# Patient Record
Sex: FEMALE | Race: BLACK OR AFRICAN AMERICAN | NOT HISPANIC OR LATINO | Employment: UNEMPLOYED | URBAN - NONMETROPOLITAN AREA
[De-identification: names, ages, dates, MRNs, and addresses within clinical notes are randomized per-mention and may not be internally consistent; named-entity substitution may affect disease eponyms.]

---

## 2019-04-26 ENCOUNTER — HOSPITAL ENCOUNTER (EMERGENCY)
Facility: HOSPITAL | Age: 59
Discharge: HOME | End: 2019-04-26
Admitting: EMERGENCY MEDICINE

## 2019-04-26 ENCOUNTER — APPOINTMENT (OUTPATIENT)
Dept: GENERAL RADIOLOGY | Facility: HOSPITAL | Age: 59
End: 2019-04-26

## 2019-04-26 ENCOUNTER — APPOINTMENT (OUTPATIENT)
Dept: CT IMAGING | Facility: HOSPITAL | Age: 59
End: 2019-04-26

## 2019-04-26 VITALS
BODY MASS INDEX: 30.89 KG/M2 | WEIGHT: 163.6 LBS | SYSTOLIC BLOOD PRESSURE: 121 MMHG | HEART RATE: 78 BPM | HEIGHT: 61 IN | OXYGEN SATURATION: 100 % | TEMPERATURE: 98 F | RESPIRATION RATE: 15 BRPM | DIASTOLIC BLOOD PRESSURE: 63 MMHG

## 2019-04-26 DIAGNOSIS — R42 DIZZINESS: Primary | ICD-10-CM

## 2019-04-26 DIAGNOSIS — R31.21 ASYMPTOMATIC MICROSCOPIC HEMATURIA: ICD-10-CM

## 2019-04-26 LAB
ALBUMIN SERPL-MCNC: 4.4 G/DL (ref 3.5–5)
ALBUMIN/GLOB SERPL: 1.6 G/DL (ref 1.1–2.5)
ALP SERPL-CCNC: 95 U/L (ref 24–120)
ALT SERPL W P-5'-P-CCNC: 19 U/L (ref 0–54)
ANION GAP SERPL CALCULATED.3IONS-SCNC: 10 MMOL/L (ref 4–13)
AST SERPL-CCNC: 22 U/L (ref 7–45)
BACTERIA UR QL AUTO: ABNORMAL /HPF
BASOPHILS # BLD AUTO: 0.03 10*3/MM3 (ref 0–0.2)
BASOPHILS NFR BLD AUTO: 0.5 % (ref 0–2)
BILIRUB SERPL-MCNC: 0.3 MG/DL (ref 0.1–1)
BILIRUB UR QL STRIP: NEGATIVE
BUN BLD-MCNC: 16 MG/DL (ref 5–21)
BUN/CREAT SERPL: 17.4 (ref 7–25)
CALCIUM SPEC-SCNC: 9.2 MG/DL (ref 8.4–10.4)
CHLORIDE SERPL-SCNC: 104 MMOL/L (ref 98–110)
CLARITY UR: CLEAR
CO2 SERPL-SCNC: 26 MMOL/L (ref 24–31)
COLOR UR: YELLOW
CREAT BLD-MCNC: 0.92 MG/DL (ref 0.5–1.4)
DEPRECATED RDW RBC AUTO: 46.6 FL (ref 40–54)
EOSINOPHIL # BLD AUTO: 0.07 10*3/MM3 (ref 0–0.7)
EOSINOPHIL NFR BLD AUTO: 1.1 % (ref 0–4)
ERYTHROCYTE [DISTWIDTH] IN BLOOD BY AUTOMATED COUNT: 17.2 % (ref 12–15)
GFR SERPL CREATININE-BSD FRML MDRD: 76 ML/MIN/1.73
GLOBULIN UR ELPH-MCNC: 2.8 GM/DL
GLUCOSE BLD-MCNC: 260 MG/DL (ref 70–100)
GLUCOSE BLDC GLUCOMTR-MCNC: 200 MG/DL (ref 70–130)
GLUCOSE UR STRIP-MCNC: ABNORMAL MG/DL
HCT VFR BLD AUTO: 40.5 % (ref 37–47)
HGB BLD-MCNC: 13 G/DL (ref 12–16)
HGB UR QL STRIP.AUTO: ABNORMAL
HOLD SPECIMEN: NORMAL
HOLD SPECIMEN: NORMAL
HYALINE CASTS UR QL AUTO: ABNORMAL /LPF
IMM GRANULOCYTES # BLD AUTO: 0.03 10*3/MM3 (ref 0–0.05)
IMM GRANULOCYTES NFR BLD AUTO: 0.5 % (ref 0–5)
KETONES UR QL STRIP: NEGATIVE
LEUKOCYTE ESTERASE UR QL STRIP.AUTO: NEGATIVE
LYMPHOCYTES # BLD AUTO: 1.23 10*3/MM3 (ref 0.72–4.86)
LYMPHOCYTES NFR BLD AUTO: 19.7 % (ref 15–45)
MCH RBC QN AUTO: 24.6 PG (ref 28–32)
MCHC RBC AUTO-ENTMCNC: 32.1 G/DL (ref 33–36)
MCV RBC AUTO: 76.7 FL (ref 82–98)
MONOCYTES # BLD AUTO: 0.38 10*3/MM3 (ref 0.19–1.3)
MONOCYTES NFR BLD AUTO: 6.1 % (ref 4–12)
NEUTROPHILS # BLD AUTO: 4.49 10*3/MM3 (ref 1.87–8.4)
NEUTROPHILS NFR BLD AUTO: 72.1 % (ref 39–78)
NITRITE UR QL STRIP: NEGATIVE
NRBC BLD AUTO-RTO: 0 /100 WBC (ref 0–0.2)
PH UR STRIP.AUTO: <=5 [PH] (ref 5–8)
PLATELET # BLD AUTO: 286 10*3/MM3 (ref 130–400)
PMV BLD AUTO: 9.8 FL (ref 6–12)
POTASSIUM BLD-SCNC: 4.1 MMOL/L (ref 3.5–5.3)
PROT SERPL-MCNC: 7.2 G/DL (ref 6.3–8.7)
PROT UR QL STRIP: NEGATIVE
RBC # BLD AUTO: 5.28 10*6/MM3 (ref 4.2–5.4)
RBC # UR: ABNORMAL /HPF
REF LAB TEST METHOD: ABNORMAL
SODIUM BLD-SCNC: 140 MMOL/L (ref 135–145)
SP GR UR STRIP: 1.03 (ref 1–1.03)
SQUAMOUS #/AREA URNS HPF: ABNORMAL /HPF
TROPONIN I SERPL-MCNC: <0.012 NG/ML (ref 0–0.03)
UROBILINOGEN UR QL STRIP: ABNORMAL
WBC NRBC COR # BLD: 6.23 10*3/MM3 (ref 4.8–10.8)
WBC UR QL AUTO: ABNORMAL /HPF
WHOLE BLOOD HOLD SPECIMEN: NORMAL
WHOLE BLOOD HOLD SPECIMEN: NORMAL

## 2019-04-26 PROCEDURE — 93010 ELECTROCARDIOGRAM REPORT: CPT | Performed by: INTERNAL MEDICINE

## 2019-04-26 PROCEDURE — 87086 URINE CULTURE/COLONY COUNT: CPT | Performed by: PHYSICIAN ASSISTANT

## 2019-04-26 PROCEDURE — 93005 ELECTROCARDIOGRAM TRACING: CPT | Performed by: PHYSICIAN ASSISTANT

## 2019-04-26 PROCEDURE — 84484 ASSAY OF TROPONIN QUANT: CPT | Performed by: PHYSICIAN ASSISTANT

## 2019-04-26 PROCEDURE — 70450 CT HEAD/BRAIN W/O DYE: CPT

## 2019-04-26 PROCEDURE — 80053 COMPREHEN METABOLIC PANEL: CPT | Performed by: PHYSICIAN ASSISTANT

## 2019-04-26 PROCEDURE — 85025 COMPLETE CBC W/AUTO DIFF WBC: CPT | Performed by: PHYSICIAN ASSISTANT

## 2019-04-26 PROCEDURE — 82962 GLUCOSE BLOOD TEST: CPT

## 2019-04-26 PROCEDURE — 81001 URINALYSIS AUTO W/SCOPE: CPT | Performed by: PHYSICIAN ASSISTANT

## 2019-04-26 PROCEDURE — 99284 EMERGENCY DEPT VISIT MOD MDM: CPT

## 2019-04-26 PROCEDURE — 93005 ELECTROCARDIOGRAM TRACING: CPT

## 2019-04-26 PROCEDURE — 71045 X-RAY EXAM CHEST 1 VIEW: CPT

## 2019-04-26 RX ORDER — MECLIZINE HYDROCHLORIDE 25 MG/1
25 TABLET ORAL 3 TIMES DAILY PRN
Qty: 20 TABLET | Refills: 0 | Status: SHIPPED | OUTPATIENT
Start: 2019-04-26 | End: 2019-05-01

## 2019-04-26 RX ORDER — ATORVASTATIN CALCIUM 40 MG/1
40 TABLET, FILM COATED ORAL NIGHTLY
COMMUNITY

## 2019-04-26 RX ORDER — ASPIRIN 325 MG
325 TABLET ORAL DAILY
COMMUNITY

## 2019-04-26 RX ORDER — AMLODIPINE BESYLATE 5 MG/1
5 TABLET ORAL DAILY
COMMUNITY

## 2019-04-26 RX ORDER — MECLIZINE HYDROCHLORIDE 25 MG/1
25 TABLET ORAL ONCE
Status: COMPLETED | OUTPATIENT
Start: 2019-04-26 | End: 2019-04-26

## 2019-04-26 RX ORDER — LISINOPRIL 20 MG/1
20 TABLET ORAL DAILY
COMMUNITY

## 2019-04-26 RX ADMIN — MECLIZINE HYDROCHLORIDE 25 MG: 25 TABLET ORAL at 15:51

## 2019-04-26 RX ADMIN — SODIUM CHLORIDE 1000 ML: 9 INJECTION, SOLUTION INTRAVENOUS at 15:48

## 2019-04-28 LAB — BACTERIA SPEC AEROBE CULT: NORMAL

## 2019-04-29 ENCOUNTER — APPOINTMENT (OUTPATIENT)
Dept: CT IMAGING | Age: 59
End: 2019-04-29
Payer: COMMERCIAL

## 2019-04-29 ENCOUNTER — HOSPITAL ENCOUNTER (EMERGENCY)
Age: 59
Discharge: HOME | End: 2019-04-30
Attending: FAMILY MEDICINE
Payer: COMMERCIAL

## 2019-04-29 DIAGNOSIS — N20.0 NEPHROLITHIASIS: Primary | ICD-10-CM

## 2019-04-29 DIAGNOSIS — A49.9 BACTERIAL UTI: ICD-10-CM

## 2019-04-29 DIAGNOSIS — N39.0 BACTERIAL UTI: ICD-10-CM

## 2019-04-29 DIAGNOSIS — M54.50 MIDLINE LOW BACK PAIN WITHOUT SCIATICA, UNSPECIFIED CHRONICITY: ICD-10-CM

## 2019-04-29 LAB
ALBUMIN SERPL-MCNC: 4.7 G/DL (ref 3.5–5.2)
ALP BLD-CCNC: 110 U/L (ref 35–104)
ALT SERPL-CCNC: 14 U/L (ref 5–33)
ANION GAP SERPL CALCULATED.3IONS-SCNC: 11 MMOL/L (ref 7–19)
AST SERPL-CCNC: 19 U/L (ref 5–32)
BASOPHILS ABSOLUTE: 0 K/UL (ref 0–0.2)
BASOPHILS RELATIVE PERCENT: 0.5 % (ref 0–1)
BILIRUB SERPL-MCNC: <0.2 MG/DL (ref 0.2–1.2)
BILIRUBIN URINE: NEGATIVE
BLOOD, URINE: ABNORMAL
BUN BLDV-MCNC: 16 MG/DL (ref 6–20)
CALCIUM SERPL-MCNC: 9.8 MG/DL (ref 8.6–10)
CHLORIDE BLD-SCNC: 103 MMOL/L (ref 98–111)
CLARITY: CLEAR
CO2: 25 MMOL/L (ref 22–29)
COLOR: YELLOW
CREAT SERPL-MCNC: 1 MG/DL (ref 0.5–0.9)
EOSINOPHILS ABSOLUTE: 0.1 K/UL (ref 0–0.6)
EOSINOPHILS RELATIVE PERCENT: 1 % (ref 0–5)
GFR NON-AFRICAN AMERICAN: 57
GLUCOSE BLD-MCNC: 138 MG/DL (ref 74–109)
GLUCOSE URINE: NEGATIVE MG/DL
HCT VFR BLD CALC: 45.2 % (ref 37–47)
HEMOGLOBIN: 14.1 G/DL (ref 12–16)
KETONES, URINE: NEGATIVE MG/DL
LEUKOCYTE ESTERASE, URINE: ABNORMAL
LYMPHOCYTES ABSOLUTE: 2 K/UL (ref 1.1–4.5)
LYMPHOCYTES RELATIVE PERCENT: 25 % (ref 20–40)
MCH RBC QN AUTO: 24.7 PG (ref 27–31)
MCHC RBC AUTO-ENTMCNC: 31.2 G/DL (ref 33–37)
MCV RBC AUTO: 79.3 FL (ref 81–99)
MONOCYTES ABSOLUTE: 0.5 K/UL (ref 0–0.9)
MONOCYTES RELATIVE PERCENT: 6.7 % (ref 0–10)
NEUTROPHILS ABSOLUTE: 5.3 K/UL (ref 1.5–7.5)
NEUTROPHILS RELATIVE PERCENT: 66.3 % (ref 50–65)
NITRITE, URINE: NEGATIVE
PDW BLD-RTO: 17.5 % (ref 11.5–14.5)
PH UA: 5 (ref 5–8)
PLATELET # BLD: 301 K/UL (ref 130–400)
PMV BLD AUTO: 10.2 FL (ref 9.4–12.3)
POTASSIUM SERPL-SCNC: 4.3 MMOL/L (ref 3.5–5)
PROTEIN UA: NEGATIVE MG/DL
RBC # BLD: 5.7 M/UL (ref 4.2–5.4)
SODIUM BLD-SCNC: 139 MMOL/L (ref 136–145)
SPECIFIC GRAVITY UA: 1.01 (ref 1–1.03)
TOTAL PROTEIN: 7.9 G/DL (ref 6.6–8.7)
URINE REFLEX TO CULTURE: YES
UROBILINOGEN, URINE: 0.2 E.U./DL
WBC # BLD: 7.9 K/UL (ref 4.8–10.8)

## 2019-04-29 PROCEDURE — 36415 COLL VENOUS BLD VENIPUNCTURE: CPT

## 2019-04-29 PROCEDURE — 2580000003 HC RX 258: Performed by: FAMILY MEDICINE

## 2019-04-29 PROCEDURE — 85025 COMPLETE CBC W/AUTO DIFF WBC: CPT

## 2019-04-29 PROCEDURE — 74176 CT ABD & PELVIS W/O CONTRAST: CPT

## 2019-04-29 PROCEDURE — 99284 EMERGENCY DEPT VISIT MOD MDM: CPT | Performed by: FAMILY MEDICINE

## 2019-04-29 PROCEDURE — 6370000000 HC RX 637 (ALT 250 FOR IP): Performed by: FAMILY MEDICINE

## 2019-04-29 PROCEDURE — 80053 COMPREHEN METABOLIC PANEL: CPT

## 2019-04-29 PROCEDURE — 99284 EMERGENCY DEPT VISIT MOD MDM: CPT

## 2019-04-29 RX ORDER — ONDANSETRON 4 MG/1
4 TABLET, ORALLY DISINTEGRATING ORAL ONCE
Status: COMPLETED | OUTPATIENT
Start: 2019-04-29 | End: 2019-04-29

## 2019-04-29 RX ORDER — MECLIZINE HYDROCHLORIDE 25 MG/1
25 TABLET ORAL 3 TIMES DAILY PRN
COMMUNITY
End: 2022-03-03

## 2019-04-29 RX ORDER — AMLODIPINE BESYLATE 5 MG/1
5 TABLET ORAL DAILY
COMMUNITY

## 2019-04-29 RX ORDER — SODIUM CHLORIDE 9 MG/ML
INJECTION, SOLUTION INTRAVENOUS CONTINUOUS
Status: DISCONTINUED | OUTPATIENT
Start: 2019-04-29 | End: 2019-04-30 | Stop reason: HOSPADM

## 2019-04-29 RX ORDER — HYDROCODONE BITARTRATE AND ACETAMINOPHEN 7.5; 325 MG/1; MG/1
1 TABLET ORAL ONCE
Status: DISCONTINUED | OUTPATIENT
Start: 2019-04-29 | End: 2019-04-30 | Stop reason: HOSPADM

## 2019-04-29 RX ORDER — LISINOPRIL 20 MG/1
20 TABLET ORAL DAILY
COMMUNITY

## 2019-04-29 RX ORDER — LEVOCETIRIZINE DIHYDROCHLORIDE 5 MG/1
5 TABLET, FILM COATED ORAL NIGHTLY
COMMUNITY
End: 2022-03-03

## 2019-04-29 RX ORDER — ASPIRIN 325 MG
325 TABLET ORAL DAILY
COMMUNITY

## 2019-04-29 RX ORDER — ATORVASTATIN CALCIUM 40 MG/1
40 TABLET, FILM COATED ORAL DAILY
COMMUNITY

## 2019-04-29 RX ADMIN — SODIUM CHLORIDE: 9 INJECTION, SOLUTION INTRAVENOUS at 22:45

## 2019-04-29 RX ADMIN — ONDANSETRON 4 MG: 4 TABLET, ORALLY DISINTEGRATING ORAL at 22:44

## 2019-04-29 SDOH — HEALTH STABILITY: MENTAL HEALTH: HOW OFTEN DO YOU HAVE A DRINK CONTAINING ALCOHOL?: NEVER

## 2019-04-29 ASSESSMENT — ENCOUNTER SYMPTOMS
WHEEZING: 0
DIARRHEA: 0
COUGH: 0
BACK PAIN: 1
CHEST TIGHTNESS: 0
NAUSEA: 0
COLOR CHANGE: 0
VOMITING: 0
RHINORRHEA: 0
SINUS PAIN: 0
SHORTNESS OF BREATH: 0
ABDOMINAL PAIN: 0
CONSTIPATION: 0

## 2019-04-29 ASSESSMENT — PAIN SCALES - GENERAL
PAINLEVEL_OUTOF10: 6
PAINLEVEL_OUTOF10: 4

## 2019-04-30 VITALS
BODY MASS INDEX: 30.78 KG/M2 | WEIGHT: 163 LBS | TEMPERATURE: 98.6 F | RESPIRATION RATE: 16 BRPM | HEIGHT: 61 IN | OXYGEN SATURATION: 91 % | DIASTOLIC BLOOD PRESSURE: 73 MMHG | SYSTOLIC BLOOD PRESSURE: 118 MMHG | HEART RATE: 90 BPM

## 2019-04-30 LAB
BACTERIA: ABNORMAL /HPF
EPITHELIAL CELLS, UA: ABNORMAL /HPF
MUCUS: ABNORMAL /LPF
RBC UA: ABNORMAL /HPF (ref 0–2)
WBC UA: ABNORMAL /HPF (ref 0–5)

## 2019-04-30 PROCEDURE — 81001 URINALYSIS AUTO W/SCOPE: CPT

## 2019-04-30 PROCEDURE — 87086 URINE CULTURE/COLONY COUNT: CPT

## 2019-04-30 RX ORDER — SULFAMETHOXAZOLE AND TRIMETHOPRIM 800; 160 MG/1; MG/1
1 TABLET ORAL 2 TIMES DAILY
Qty: 14 TABLET | Refills: 0 | Status: SHIPPED | OUTPATIENT
Start: 2019-04-30 | End: 2019-05-07

## 2019-04-30 RX ORDER — ONDANSETRON 4 MG/1
4 TABLET, ORALLY DISINTEGRATING ORAL EVERY 8 HOURS PRN
Qty: 15 TABLET | Refills: 0 | Status: SHIPPED | OUTPATIENT
Start: 2019-04-30 | End: 2022-03-03

## 2019-04-30 RX ORDER — PHENAZOPYRIDINE HYDROCHLORIDE 100 MG/1
100 TABLET, FILM COATED ORAL 3 TIMES DAILY PRN
Qty: 6 TABLET | Refills: 0 | Status: SHIPPED | OUTPATIENT
Start: 2019-04-30 | End: 2019-05-03

## 2019-04-30 NOTE — ED PROVIDER NOTES
[facility]  eMERGENCY dEPARTMENT eNCOUnter      Pt Name: Koby Aguilera  MRN: [id number]  [place] 1960  Date of evaluation: 4/29/2019  Provider: Gurjit Mejia MD    [address] [place] [address]       Chief Complaint   Patient presents with    Flank Pain     left         HISTORY OF PRESENT ILLNESS   (Location/Symptom, Timing/Onset,Context/Setting, Quality, Duration, Modifying Factors, Severity)  Note limiting factors. Koby Aguilera is a 60 y.o. female who presents to the emergency department      Patient presents today with complaint of back pain. It's that this pain has been off and on for approximately one week now she had seen her primary provider who detected blood in her urine and then suggested a CAT scan to make sure that there is no kidney stone. Patient states that movement seems to make the pain worse and that her primary care doctor provided her no medications either for the pain nor her nausea. NursingNotes were reviewed. REVIEW OF SYSTEMS    (2-9 systems for level 4, 10 or more for level 5)     Review of Systems   Constitutional: Negative for activity change, appetite change, chills, fatigue and fever. HENT: Negative for congestion, rhinorrhea and sinus pain. Respiratory: Negative for cough, chest tightness, shortness of breath and wheezing. Cardiovascular: Negative for chest pain, palpitations and leg swelling. Gastrointestinal: Negative for abdominal pain, constipation, diarrhea, nausea and vomiting. Genitourinary: Negative for difficulty urinating and dysuria. Musculoskeletal: Positive for back pain. Skin: Negative for color change, pallor and rash. Neurological: Negative for syncope, weakness and numbness. Hematological: Does not bruise/bleed easily.             PAST MEDICALHISTORY     Past Medical History:   Diagnosis Date    Diabetes mellitus ([address]     Hyperlipidemia     Hypertension          SURGICAL HISTORY       Past Surgical History:   Procedure Emotionally abused: None     Physically abused: None     Forced sexual activity: None   Other Topics Concern    None   Social History Narrative    None       SCREENINGS             PHYSICAL EXAM    (up to 7 for level 4, 8 or more for level 5)     ED Triage Vitals [04/29/19 1850]   BP Temp Temp Source Pulse Resp SpO2 Height Weight   (!) 156/92 98.6 °F (37 °C) Oral 97 18 96 % 5' 1\" (1.549 m) 163 lb (73.9 kg)       Physical Exam   Constitutional: She is oriented to person, place, and time. She appears well-developed and well-nourished. No distress. HENT:   Head: Normocephalic. Cardiovascular: Normal rate, normal heart sounds and intact distal pulses. Pulmonary/Chest: Effort normal and breath sounds normal.   Abdominal: Soft. Bowel sounds are normal. She exhibits no distension and no mass. There is no tenderness. There is no rebound and no guarding. Musculoskeletal: She exhibits no edema. Lumbar back: She exhibits decreased range of motion and tenderness. Back:    Neurological: She is alert and oriented to person, place, and time. Skin: She is not diaphoretic.        DIAGNOSTIC RESULTS     EKG: All EKG's areinterpreted by the Emergency Department Physician who either signs or Co-signs this chart in the absence of a cardiologist.        RADIOLOGY:  Non-plain film images such as CT, Ultrasound and MRI are read by the radiologist. Plain radiographic images are visualized and preliminarily interpreted bythe emergency physician with the below findings:          CT ABDOMEN PELVIS WO CONTRAST Additional Contrast? None    (Results Pending)           LABS:  Labs Reviewed   CBC WITH AUTO DIFFERENTIAL - Abnormal; Notable for the following components:       Result Value    RBC 5.70 (*)     MCV 79.3 (*)     MCH 24.7 (*)     MCHC 31.2 (*)     RDW 17.5 (*)     Neutrophils % 66.3 (*)     All other components within normal limits   COMPREHENSIVE METABOLIC PANEL - Abnormal; Notable for the following components: Glucose 138 (*)     CREATININE 1.0 (*)     GFR Non-African American 57 (*)     Alkaline Phosphatase 110 (*)     All other components within normal limits   URINE RT REFLEX TO CULTURE - Abnormal; Notable for the following components:    Blood, Urine MODERATE (*)     Leukocyte Esterase, Urine MODERATE (*)     All other components within normal limits   URINE CULTURE   MICROSCOPIC URINALYSIS       All other labs were within normal range or not returned as of this dictation. EMERGENCY DEPARTMENT COURSE and DIFFERENTIAL DIAGNOSIS/MDM:   Vitals:    Vitals:    04/29/19 1850   BP: (!) 156/92   Pulse: 97   Resp: 18   Temp: 98.6 °F (37 °C)   TempSrc: Oral   SpO2: 96%   Weight: 163 lb (73.9 kg)   Height: 5' 1\" (1.549 m)       MDM      Reassessment      CONSULTS:  None    PROCEDURES:  Unless otherwise noted below, none     Procedures    FINAL IMPRESSION      1. Nephrolithiasis    2. Midline low back pain without sciatica, unspecified chronicity    3. Bacterial UTI          DISPOSITION/PLAN   DISPOSITION Decision To Discharge 04/29/2019 11:58:24 PM      PATIENT REFERRED TO:  No follow-up provider specified.     DISCHARGE MEDICATIONS:  New Prescriptions    ONDANSETRON (ZOFRAN ODT) 4 MG DISINTEGRATING TABLET    Take 1 tablet by mouth every 8 hours as needed for Nausea or Vomiting    PHENAZOPYRIDINE (PYRIDIUM) 100 MG TABLET    Take 1 tablet by mouth 3 times daily as needed for Pain    SULFAMETHOXAZOLE-TRIMETHOPRIM (BACTRIM DS) 800-160 MG PER TABLET    Take 1 tablet by mouth 2 times daily for 7 days          (Please note that portions of this note were completed with a voice recognition program.  Efforts were made to edit thedictations but occasionally words are mis-transcribed.)    Kary Garcia MD (electronically signed)  Attending Emergency Physician         Travis Arambula MD  04/30/19 0005       Travis Arambula MD  05/02/19 [id number]

## 2019-05-02 LAB — URINE CULTURE, ROUTINE: NORMAL

## 2021-05-12 ENCOUNTER — HOSPITAL ENCOUNTER (EMERGENCY)
Facility: HOSPITAL | Age: 61
Discharge: HOME | End: 2021-05-12
Attending: EMERGENCY MEDICINE | Admitting: EMERGENCY MEDICINE

## 2021-05-12 VITALS
WEIGHT: 172 LBS | DIASTOLIC BLOOD PRESSURE: 68 MMHG | OXYGEN SATURATION: 99 % | BODY MASS INDEX: 32.47 KG/M2 | RESPIRATION RATE: 18 BRPM | HEIGHT: 61 IN | TEMPERATURE: 98.7 F | SYSTOLIC BLOOD PRESSURE: 123 MMHG | HEART RATE: 74 BPM

## 2021-05-12 DIAGNOSIS — N30.90 CYSTITIS: Primary | ICD-10-CM

## 2021-05-12 DIAGNOSIS — N32.89 BLADDER SPASMS: ICD-10-CM

## 2021-05-12 LAB
ALBUMIN SERPL-MCNC: 4.3 G/DL (ref 3.5–5.2)
ALBUMIN/GLOB SERPL: 1.7 G/DL
ALP SERPL-CCNC: 111 U/L (ref 39–117)
ALT SERPL W P-5'-P-CCNC: 20 U/L (ref 1–33)
ANION GAP SERPL CALCULATED.3IONS-SCNC: 10 MMOL/L (ref 5–15)
AST SERPL-CCNC: 19 U/L (ref 1–32)
BACTERIA UR QL AUTO: ABNORMAL /HPF
BASOPHILS # BLD AUTO: 0.03 10*3/MM3 (ref 0–0.2)
BASOPHILS NFR BLD AUTO: 0.7 % (ref 0–1.5)
BILIRUB SERPL-MCNC: <0.2 MG/DL (ref 0–1.2)
BILIRUB UR QL STRIP: NEGATIVE
BUN SERPL-MCNC: 16 MG/DL (ref 8–23)
BUN/CREAT SERPL: 15 (ref 7–25)
CALCIUM SPEC-SCNC: 9.3 MG/DL (ref 8.6–10.5)
CHLORIDE SERPL-SCNC: 104 MMOL/L (ref 98–107)
CLARITY UR: CLEAR
CO2 SERPL-SCNC: 28 MMOL/L (ref 22–29)
COLOR UR: YELLOW
CREAT SERPL-MCNC: 1.07 MG/DL (ref 0.57–1)
DEPRECATED RDW RBC AUTO: 45.7 FL (ref 37–54)
EOSINOPHIL # BLD AUTO: 0.07 10*3/MM3 (ref 0–0.4)
EOSINOPHIL NFR BLD AUTO: 1.5 % (ref 0.3–6.2)
ERYTHROCYTE [DISTWIDTH] IN BLOOD BY AUTOMATED COUNT: 16.4 % (ref 12.3–15.4)
GFR SERPL CREATININE-BSD FRML MDRD: 63 ML/MIN/1.73
GLOBULIN UR ELPH-MCNC: 2.6 GM/DL
GLUCOSE SERPL-MCNC: 316 MG/DL (ref 65–99)
GLUCOSE UR STRIP-MCNC: NEGATIVE MG/DL
HCT VFR BLD AUTO: 36.3 % (ref 34–46.6)
HGB BLD-MCNC: 11.2 G/DL (ref 12–15.9)
HGB UR QL STRIP.AUTO: NEGATIVE
HYALINE CASTS UR QL AUTO: ABNORMAL /LPF
IMM GRANULOCYTES # BLD AUTO: 0.02 10*3/MM3 (ref 0–0.05)
IMM GRANULOCYTES NFR BLD AUTO: 0.4 % (ref 0–0.5)
KETONES UR QL STRIP: ABNORMAL
LEUKOCYTE ESTERASE UR QL STRIP.AUTO: ABNORMAL
LYMPHOCYTES # BLD AUTO: 1.37 10*3/MM3 (ref 0.7–3.1)
LYMPHOCYTES NFR BLD AUTO: 29.8 % (ref 19.6–45.3)
MCH RBC QN AUTO: 23.8 PG (ref 26.6–33)
MCHC RBC AUTO-ENTMCNC: 30.9 G/DL (ref 31.5–35.7)
MCV RBC AUTO: 77.1 FL (ref 79–97)
MONOCYTES # BLD AUTO: 0.4 10*3/MM3 (ref 0.1–0.9)
MONOCYTES NFR BLD AUTO: 8.7 % (ref 5–12)
NEUTROPHILS NFR BLD AUTO: 2.7 10*3/MM3 (ref 1.7–7)
NEUTROPHILS NFR BLD AUTO: 58.9 % (ref 42.7–76)
NITRITE UR QL STRIP: NEGATIVE
NRBC BLD AUTO-RTO: 0 /100 WBC (ref 0–0.2)
PH UR STRIP.AUTO: 7 [PH] (ref 5–8)
PLATELET # BLD AUTO: 248 10*3/MM3 (ref 140–450)
PMV BLD AUTO: 10.2 FL (ref 6–12)
POTASSIUM SERPL-SCNC: 4.2 MMOL/L (ref 3.5–5.2)
PROT SERPL-MCNC: 6.9 G/DL (ref 6–8.5)
PROT UR QL STRIP: NEGATIVE
RBC # BLD AUTO: 4.71 10*6/MM3 (ref 3.77–5.28)
RBC # UR: ABNORMAL /HPF
REF LAB TEST METHOD: ABNORMAL
SODIUM SERPL-SCNC: 142 MMOL/L (ref 136–145)
SP GR UR STRIP: 1.02 (ref 1–1.03)
SQUAMOUS #/AREA URNS HPF: ABNORMAL /HPF
UROBILINOGEN UR QL STRIP: ABNORMAL
WBC # BLD AUTO: 4.59 10*3/MM3 (ref 3.4–10.8)
WBC UR QL AUTO: ABNORMAL /HPF

## 2021-05-12 PROCEDURE — 81001 URINALYSIS AUTO W/SCOPE: CPT | Performed by: EMERGENCY MEDICINE

## 2021-05-12 PROCEDURE — 25010000002 CEFTRIAXONE PER 250 MG: Performed by: EMERGENCY MEDICINE

## 2021-05-12 PROCEDURE — 80053 COMPREHEN METABOLIC PANEL: CPT | Performed by: EMERGENCY MEDICINE

## 2021-05-12 PROCEDURE — 96365 THER/PROPH/DIAG IV INF INIT: CPT

## 2021-05-12 PROCEDURE — 51798 US URINE CAPACITY MEASURE: CPT

## 2021-05-12 PROCEDURE — 85025 COMPLETE CBC W/AUTO DIFF WBC: CPT | Performed by: EMERGENCY MEDICINE

## 2021-05-12 PROCEDURE — 99283 EMERGENCY DEPT VISIT LOW MDM: CPT

## 2021-05-12 RX ORDER — PHENAZOPYRIDINE HYDROCHLORIDE 100 MG/1
100 TABLET, FILM COATED ORAL 3 TIMES DAILY PRN
Qty: 15 TABLET | Refills: 0 | Status: SHIPPED | OUTPATIENT
Start: 2021-05-12 | End: 2021-12-23

## 2021-05-12 RX ADMIN — CEFTRIAXONE SODIUM 2 G: 2 INJECTION, POWDER, FOR SOLUTION INTRAMUSCULAR; INTRAVENOUS at 20:14

## 2021-12-23 ENCOUNTER — OFFICE VISIT (OUTPATIENT)
Dept: ENDOCRINOLOGY | Facility: CLINIC | Age: 61
End: 2021-12-23

## 2021-12-23 VITALS
HEIGHT: 61 IN | SYSTOLIC BLOOD PRESSURE: 110 MMHG | DIASTOLIC BLOOD PRESSURE: 50 MMHG | BODY MASS INDEX: 33.23 KG/M2 | HEART RATE: 53 BPM | OXYGEN SATURATION: 94 % | WEIGHT: 176 LBS

## 2021-12-23 DIAGNOSIS — Z79.4 TYPE 2 DIABETES MELLITUS WITH HYPERGLYCEMIA, WITH LONG-TERM CURRENT USE OF INSULIN: Primary | ICD-10-CM

## 2021-12-23 DIAGNOSIS — E11.65 TYPE 2 DIABETES MELLITUS WITH HYPERGLYCEMIA, WITH LONG-TERM CURRENT USE OF INSULIN: Primary | ICD-10-CM

## 2021-12-23 PROCEDURE — 99204 OFFICE O/P NEW MOD 45 MIN: CPT | Performed by: INTERNAL MEDICINE

## 2021-12-23 PROCEDURE — 95251 CONT GLUC MNTR ANALYSIS I&R: CPT | Performed by: INTERNAL MEDICINE

## 2021-12-23 RX ORDER — PROCHLORPERAZINE 25 MG/1
1 SUPPOSITORY RECTAL ONCE
Qty: 1 EACH | Refills: 3 | Status: SHIPPED | OUTPATIENT
Start: 2021-12-23 | End: 2021-12-23

## 2021-12-23 RX ORDER — EMPAGLIFLOZIN, METFORMIN HYDROCHLORIDE 5; 1000 MG/1; MG/1
2 TABLET, EXTENDED RELEASE ORAL
Qty: 60 TABLET | Refills: 11 | Status: SHIPPED | OUTPATIENT
Start: 2021-12-23 | End: 2022-01-04

## 2021-12-23 RX ORDER — PROCHLORPERAZINE 25 MG/1
1 SUPPOSITORY RECTAL ONCE
Qty: 1 EACH | Refills: 3 | Status: SHIPPED | OUTPATIENT
Start: 2021-12-23 | End: 2021-12-23 | Stop reason: SDUPTHER

## 2021-12-23 RX ORDER — EMPAGLIFLOZIN, METFORMIN HYDROCHLORIDE 5; 1000 MG/1; MG/1
2 TABLET, EXTENDED RELEASE ORAL
Qty: 60 TABLET | Refills: 11 | Status: SHIPPED | OUTPATIENT
Start: 2021-12-23 | End: 2021-12-23 | Stop reason: SDUPTHER

## 2021-12-23 RX ORDER — PROCHLORPERAZINE 25 MG/1
SUPPOSITORY RECTAL AS NEEDED
Qty: 9 EACH | Refills: 3 | Status: SHIPPED | OUTPATIENT
Start: 2021-12-23 | End: 2022-01-04

## 2021-12-23 RX ORDER — INSULIN DEGLUDEC INJECTION 100 U/ML
40 INJECTION, SOLUTION SUBCUTANEOUS NIGHTLY
Qty: 4 PEN | Refills: 11 | Status: SHIPPED | OUTPATIENT
Start: 2021-12-23 | End: 2022-04-11

## 2021-12-23 RX ORDER — INSULIN LISPRO-AABC 100 [IU]/ML
INJECTION, SOLUTION SUBCUTANEOUS
Qty: 9 PEN | Refills: 11 | Status: SHIPPED | OUTPATIENT
Start: 2021-12-23 | End: 2021-12-23 | Stop reason: SDUPTHER

## 2021-12-23 RX ORDER — PROCHLORPERAZINE 25 MG/1
SUPPOSITORY RECTAL AS NEEDED
Qty: 9 EACH | Refills: 3 | Status: SHIPPED | OUTPATIENT
Start: 2021-12-23 | End: 2021-12-23 | Stop reason: SDUPTHER

## 2021-12-23 RX ORDER — PROCHLORPERAZINE 25 MG/1
1 SUPPOSITORY RECTAL ONCE
Qty: 1 EACH | Refills: 1 | Status: SHIPPED | OUTPATIENT
Start: 2021-12-23 | End: 2021-12-23 | Stop reason: SDUPTHER

## 2021-12-23 RX ORDER — PROCHLORPERAZINE 25 MG/1
1 SUPPOSITORY RECTAL ONCE
Qty: 1 EACH | Refills: 1 | Status: SHIPPED | OUTPATIENT
Start: 2021-12-23 | End: 2021-12-23

## 2021-12-23 RX ORDER — INSULIN DEGLUDEC INJECTION 100 U/ML
40 INJECTION, SOLUTION SUBCUTANEOUS NIGHTLY
Qty: 4 PEN | Refills: 11 | Status: SHIPPED | OUTPATIENT
Start: 2021-12-23 | End: 2021-12-23 | Stop reason: SDUPTHER

## 2021-12-23 RX ORDER — INSULIN LISPRO-AABC 100 [IU]/ML
INJECTION, SOLUTION SUBCUTANEOUS
Qty: 9 PEN | Refills: 11 | Status: SHIPPED | OUTPATIENT
Start: 2021-12-23

## 2021-12-28 ENCOUNTER — SPECIALTY PHARMACY (OUTPATIENT)
Dept: ENDOCRINOLOGY | Facility: CLINIC | Age: 61
End: 2021-12-28

## 2022-01-04 ENCOUNTER — LAB (OUTPATIENT)
Dept: LAB | Facility: HOSPITAL | Age: 62
End: 2022-01-04

## 2022-01-04 ENCOUNTER — OFFICE VISIT (OUTPATIENT)
Dept: ENDOCRINOLOGY | Facility: CLINIC | Age: 62
End: 2022-01-04

## 2022-01-04 VITALS
OXYGEN SATURATION: 97 % | HEART RATE: 70 BPM | BODY MASS INDEX: 31.72 KG/M2 | SYSTOLIC BLOOD PRESSURE: 110 MMHG | HEIGHT: 61 IN | WEIGHT: 168 LBS | DIASTOLIC BLOOD PRESSURE: 60 MMHG

## 2022-01-04 DIAGNOSIS — E11.69 MIXED DIABETIC HYPERLIPIDEMIA ASSOCIATED WITH TYPE 2 DIABETES MELLITUS: ICD-10-CM

## 2022-01-04 DIAGNOSIS — E11.9 WELL CONTROLLED TYPE 2 DIABETES MELLITUS: Primary | ICD-10-CM

## 2022-01-04 DIAGNOSIS — E11.59 HYPERTENSION ASSOCIATED WITH DIABETES: ICD-10-CM

## 2022-01-04 DIAGNOSIS — E78.2 MIXED DIABETIC HYPERLIPIDEMIA ASSOCIATED WITH TYPE 2 DIABETES MELLITUS: ICD-10-CM

## 2022-01-04 DIAGNOSIS — N18.2 CHRONIC KIDNEY DISEASE (CKD) STAGE G2/A1, MILDLY DECREASED GLOMERULAR FILTRATION RATE (GFR) BETWEEN 60-89 ML/MIN/1.73 SQUARE METER AND ALBUMINURIA CREATININE RATIO LESS THAN 30 MG/G: ICD-10-CM

## 2022-01-04 DIAGNOSIS — I15.2 HYPERTENSION ASSOCIATED WITH DIABETES: ICD-10-CM

## 2022-01-04 LAB
25(OH)D3 SERPL-MCNC: 32.4 NG/ML
ALBUMIN SERPL-MCNC: 4.4 G/DL (ref 3.5–5.2)
ALBUMIN UR-MCNC: 1.9 MG/DL
ALBUMIN/GLOB SERPL: 1.4 G/DL
ALP SERPL-CCNC: 111 U/L (ref 39–117)
ALT SERPL W P-5'-P-CCNC: 12 U/L (ref 1–33)
ANION GAP SERPL CALCULATED.3IONS-SCNC: 10 MMOL/L (ref 5–15)
AST SERPL-CCNC: 19 U/L (ref 1–32)
BASOPHILS # BLD AUTO: 0.04 10*3/MM3 (ref 0–0.2)
BASOPHILS NFR BLD AUTO: 0.7 % (ref 0–1.5)
BILIRUB SERPL-MCNC: 0.3 MG/DL (ref 0–1.2)
BUN SERPL-MCNC: 19 MG/DL (ref 8–23)
BUN/CREAT SERPL: 14.3 (ref 7–25)
CALCIUM SPEC-SCNC: 9.2 MG/DL (ref 8.6–10.5)
CHLORIDE SERPL-SCNC: 102 MMOL/L (ref 98–107)
CHOLEST SERPL-MCNC: 137 MG/DL (ref 0–200)
CO2 SERPL-SCNC: 26 MMOL/L (ref 22–29)
CREAT SERPL-MCNC: 1.33 MG/DL (ref 0.57–1)
CREAT UR-MCNC: 181 MG/DL
DEPRECATED RDW RBC AUTO: 46 FL (ref 37–54)
EOSINOPHIL # BLD AUTO: 0.08 10*3/MM3 (ref 0–0.4)
EOSINOPHIL NFR BLD AUTO: 1.4 % (ref 0.3–6.2)
ERYTHROCYTE [DISTWIDTH] IN BLOOD BY AUTOMATED COUNT: 17.3 % (ref 12.3–15.4)
GFR SERPL CREATININE-BSD FRML MDRD: 49 ML/MIN/1.73
GLOBULIN UR ELPH-MCNC: 3.1 GM/DL
GLUCOSE SERPL-MCNC: 207 MG/DL (ref 65–99)
HBA1C MFR BLD: 6.8 %
HCT VFR BLD AUTO: 42.5 % (ref 34–46.6)
HDLC SERPL-MCNC: 41 MG/DL (ref 40–60)
HGB BLD-MCNC: 13.4 G/DL (ref 12–15.9)
IMM GRANULOCYTES # BLD AUTO: 0.02 10*3/MM3 (ref 0–0.05)
IMM GRANULOCYTES NFR BLD AUTO: 0.4 % (ref 0–0.5)
LDLC SERPL CALC-MCNC: 75 MG/DL (ref 0–100)
LDLC/HDLC SERPL: 1.78 {RATIO}
LYMPHOCYTES # BLD AUTO: 1.33 10*3/MM3 (ref 0.7–3.1)
LYMPHOCYTES NFR BLD AUTO: 23.7 % (ref 19.6–45.3)
MCH RBC QN AUTO: 24.1 PG (ref 26.6–33)
MCHC RBC AUTO-ENTMCNC: 31.5 G/DL (ref 31.5–35.7)
MCV RBC AUTO: 76.3 FL (ref 79–97)
MICROALBUMIN/CREAT UR: 10.5 MG/G
MONOCYTES # BLD AUTO: 0.34 10*3/MM3 (ref 0.1–0.9)
MONOCYTES NFR BLD AUTO: 6.1 % (ref 5–12)
NEUTROPHILS NFR BLD AUTO: 3.8 10*3/MM3 (ref 1.7–7)
NEUTROPHILS NFR BLD AUTO: 67.7 % (ref 42.7–76)
NRBC BLD AUTO-RTO: 0 /100 WBC (ref 0–0.2)
PLATELET # BLD AUTO: 295 10*3/MM3 (ref 140–450)
PMV BLD AUTO: 9.6 FL (ref 6–12)
POTASSIUM SERPL-SCNC: 4.7 MMOL/L (ref 3.5–5.2)
PROT SERPL-MCNC: 7.5 G/DL (ref 6–8.5)
RBC # BLD AUTO: 5.57 10*6/MM3 (ref 3.77–5.28)
SODIUM SERPL-SCNC: 138 MMOL/L (ref 136–145)
TRIGL SERPL-MCNC: 115 MG/DL (ref 0–150)
TSH SERPL DL<=0.05 MIU/L-ACNC: 1.65 UIU/ML (ref 0.27–4.2)
VIT B12 BLD-MCNC: 1074 PG/ML (ref 211–946)
VLDLC SERPL-MCNC: 21 MG/DL (ref 5–40)
WBC NRBC COR # BLD: 5.61 10*3/MM3 (ref 3.4–10.8)

## 2022-01-04 PROCEDURE — 80053 COMPREHEN METABOLIC PANEL: CPT | Performed by: INTERNAL MEDICINE

## 2022-01-04 PROCEDURE — 36415 COLL VENOUS BLD VENIPUNCTURE: CPT | Performed by: INTERNAL MEDICINE

## 2022-01-04 PROCEDURE — 82043 UR ALBUMIN QUANTITATIVE: CPT | Performed by: INTERNAL MEDICINE

## 2022-01-04 PROCEDURE — 82570 ASSAY OF URINE CREATININE: CPT | Performed by: INTERNAL MEDICINE

## 2022-01-04 PROCEDURE — 84443 ASSAY THYROID STIM HORMONE: CPT | Performed by: INTERNAL MEDICINE

## 2022-01-04 PROCEDURE — 99214 OFFICE O/P EST MOD 30 MIN: CPT | Performed by: INTERNAL MEDICINE

## 2022-01-04 PROCEDURE — 82607 VITAMIN B-12: CPT | Performed by: INTERNAL MEDICINE

## 2022-01-04 PROCEDURE — 85025 COMPLETE CBC W/AUTO DIFF WBC: CPT | Performed by: INTERNAL MEDICINE

## 2022-01-04 PROCEDURE — 95251 CONT GLUC MNTR ANALYSIS I&R: CPT | Performed by: INTERNAL MEDICINE

## 2022-01-04 PROCEDURE — 80061 LIPID PANEL: CPT | Performed by: INTERNAL MEDICINE

## 2022-01-04 PROCEDURE — 82306 VITAMIN D 25 HYDROXY: CPT | Performed by: INTERNAL MEDICINE

## 2022-01-04 RX ORDER — ONDANSETRON 4 MG/1
4 TABLET, ORALLY DISINTEGRATING ORAL EVERY 8 HOURS PRN
Qty: 45 TABLET | Refills: 11 | Status: SHIPPED | OUTPATIENT
Start: 2022-01-04

## 2022-01-04 RX ORDER — FLUCONAZOLE 150 MG/1
TABLET ORAL
Qty: 2 TABLET | Refills: 6 | Status: SHIPPED | OUTPATIENT
Start: 2022-01-04

## 2022-01-04 NOTE — PROGRESS NOTES
"Chief Complaint   Patient presents with   • Diabetes     t2       History of Present Illness    61 y.o. female     Diabetes Type 2    Duration - since 1999     Complications -  GFR 59-49  Mild neuropathy     Present Monitoring -      Fingersticks -  4 x daily    CGM - now on blanca , see below     Present Regimen -  See below  Carb Intake -   Not high   Exercise -   Walking   Symptoms -   tingling in feet   ==========================================  Physical Exam  /60   Pulse 70   Ht 154.9 cm (61\")   Wt 76.2 kg (168 lb)   SpO2 97%   BMI 31.74 kg/m²   AOx3  No goiter, no carotid bruit  RRR  CTA  No Edema     ==========================================    Laboratory Workup    Lab Results   Component Value Date    WBC 5.61 01/04/2022    HGB 13.4 01/04/2022    HCT 42.5 01/04/2022    MCV 76.3 (L) 01/04/2022     01/04/2022       Lab Results   Component Value Date    GLUCOSE 207 (H) 01/04/2022    BUN 19 01/04/2022    CREATININE 1.33 (H) 01/04/2022    EGFRIFNONA 57 (A) 04/29/2019    EGFRIFAFRI 49 (L) 01/04/2022    BCR 14.3 01/04/2022    K 4.7 01/04/2022    CO2 26.0 01/04/2022    CALCIUM 9.2 01/04/2022    ALBUMIN 4.40 01/04/2022    AST 19 01/04/2022    ALT 12 01/04/2022       Lab Results   Component Value Date    EGFRIFNONA 57 (A) 04/29/2019         ==========================================      ICD-10-CM ICD-9-CM   1. Well controlled type 2 diabetes mellitus (HCC)  E11.9 250.00   2. Hypertension associated with diabetes (HCC)  E11.59 250.80    I15.2 401.9   3. Mixed diabetic hyperlipidemia associated with type 2 diabetes mellitus (HCC)  E11.69 250.80    E78.2 272.2   4. Chronic kidney disease (CKD) stage G2/A1, mildly decreased glomerular filtration rate (GFR) between 60-89 mL/min/1.73 square meter and albuminuria creatinine ratio less than 30 mg/g  N18.2 585.2       Diabetes " Mellitus    --------------------------------------------------------------------------------------------------------------------------------------------    Glycemic Management   Lab Results   Component Value Date    HGBA1C 6.8 01/04/2022       Using blanca    In range 87%  No lows     Excellent diabetes control  Report sent for scanning     --------------------------------------  Tresiba, you are taking 100 units at night  Decrease to 40 units     Compare your bedtime sugar with your waking sugar.   The closer they are the more accurate your tresiba dose is.    If you notice that the sugar decreases by more than 40 point overnight or you have a low sugar ( less than 80 ) in the middle of the night ------ decrease by 5 more unit       40 is working very well   ---------------------------------------    Humalog or Lyumjev     Take 1 unit for every 5 grams of carb before the meal     Plus sliding scale before the meal     1 unit per 25 above 150       Example     Sugar is 250 and you will eat 60 grams       For the 60 grams / 5 = 12 units     For the 250 sugar - 150 = 100 / 25 = 4units     This is working     ======================      Start trulicity 0.75 mg weekly - tolerating w some queasiness   Has also some constipation , do fiber   ==========================    synjardy 5/1000     1 tab w bkfast for 2 weeks then increase to 2 tabs w bkfast   Couldn't tolerate diarrhea    Do jardiance only 10 mg daily     ===========    gvoke         ===========      ==========================================================================  Microvascular Complication Monitoring    Neuropathy                     Mild, little tingle but not anymore   Retinopathy   No   Renal   Yes 3a  GFR   Stage 3 a    Albuminuria   No results found for: MALBCRERATIO    On ACE / ARB , lisinopril   On SGLT2i , jardiance  On Finerenone  , next appt     ==========================================================================    Lipids  No results  "found for: CHOL, CHLPL, TRIG, HDL, LDL, LDLDIRECT    Statin  lipitor 40 mg qhs     Fibrate    Vascepa    ==========================================================================    HTN  /60   Pulse 70   Ht 154.9 cm (61\")   Wt 76.2 kg (168 lb)   SpO2 97%   BMI 31.74 kg/m²     Lisinopril 20 mg qd     On aspirin 325 mg daily   ==========================================================================    Bone Health    Vit D    No results found for: [id number]    Calcium intake     ==========================================================================    Thyroid Assessment  No results found for: TSH        ==========================================================================    Vitamin B12  No results found for: [id number]      ==========================================================================    Celiac Panel               Orders Placed This Encounter   Procedures   • Hemoglobin A1c     This order was created through External Result Entry     Order Specific Question:   Release to patient     Answer:   Immediate   • CBC Auto Differential     Order Specific Question:   Release to patient     Answer:   Immediate   • Comprehensive Metabolic Panel     Order Specific Question:   Release to patient     Answer:   Immediate   • Lipid Panel   • Microalbumin / Creatinine Urine Ratio - Urine, Clean Catch     Order Specific Question:   Release to patient     Answer:   Immediate   • TSH     Order Specific Question:   Release to patient     Answer:   Immediate   • Vitamin B12     Order Specific Question:   Release to patient     Answer:   Immediate   • Vitamin D 25 Hydroxy     Order Specific Question:   Release to patient     Answer:   Immediate   • CBC Auto Differential     Order Specific Question:   Release to patient     Answer:   Immediate   • Comprehensive Metabolic Panel     Order Specific Question:   Release to patient     Answer:   Immediate   • Hemoglobin A1c     Order Specific Question:   Release " to patient     Answer:   Immediate   • Lipid Panel   • Microalbumin / Creatinine Urine Ratio - Urine, Clean Catch     Order Specific Question:   Release to patient     Answer:   Immediate   • TSH     Order Specific Question:   Release to patient     Answer:   Immediate   • Vitamin B12     Order Specific Question:   Release to patient     Answer:   Immediate   • Vitamin D 25 Hydroxy     Order Specific Question:   Release to patient     Answer:   Immediate                This document has been electronically signed by Bar Nolan MD on January 4, 2022 16:56 CST

## 2022-01-10 ENCOUNTER — TELEPHONE (OUTPATIENT)
Dept: ENDOCRINOLOGY | Facility: CLINIC | Age: 62
End: 2022-01-10

## 2022-01-10 ENCOUNTER — HOSPITAL ENCOUNTER (EMERGENCY)
Facility: HOSPITAL | Age: 62
Discharge: HOME | End: 2022-01-10
Admitting: EMERGENCY MEDICINE

## 2022-01-10 ENCOUNTER — APPOINTMENT (OUTPATIENT)
Dept: CT IMAGING | Facility: HOSPITAL | Age: 62
End: 2022-01-10

## 2022-01-10 VITALS
RESPIRATION RATE: 15 BRPM | WEIGHT: 168 LBS | OXYGEN SATURATION: 100 % | BODY MASS INDEX: 31.72 KG/M2 | HEART RATE: 77 BPM | SYSTOLIC BLOOD PRESSURE: 120 MMHG | HEIGHT: 61 IN | DIASTOLIC BLOOD PRESSURE: 68 MMHG | TEMPERATURE: 98.8 F

## 2022-01-10 DIAGNOSIS — T50.905A ADVERSE EFFECT OF DRUG, INITIAL ENCOUNTER: ICD-10-CM

## 2022-01-10 DIAGNOSIS — R11.2 NAUSEA AND VOMITING, INTRACTABILITY OF VOMITING NOT SPECIFIED, UNSPECIFIED VOMITING TYPE: Primary | ICD-10-CM

## 2022-01-10 LAB
ALBUMIN SERPL-MCNC: 4.9 G/DL (ref 3.5–5.2)
ALBUMIN/GLOB SERPL: 1.4 G/DL
ALP SERPL-CCNC: 114 U/L (ref 39–117)
ALT SERPL W P-5'-P-CCNC: 15 U/L (ref 1–33)
AMYLASE SERPL-CCNC: 115 U/L (ref 28–100)
ANION GAP SERPL CALCULATED.3IONS-SCNC: 13 MMOL/L (ref 5–15)
AST SERPL-CCNC: 24 U/L (ref 1–32)
BACTERIA UR QL AUTO: ABNORMAL /HPF
BASOPHILS # BLD AUTO: 0.03 10*3/MM3 (ref 0–0.2)
BASOPHILS NFR BLD AUTO: 0.5 % (ref 0–1.5)
BILIRUB SERPL-MCNC: 0.4 MG/DL (ref 0–1.2)
BILIRUB UR QL STRIP: NEGATIVE
BUN SERPL-MCNC: 19 MG/DL (ref 8–23)
BUN/CREAT SERPL: 17.4 (ref 7–25)
CALCIUM SPEC-SCNC: 9.6 MG/DL (ref 8.6–10.5)
CHLORIDE SERPL-SCNC: 102 MMOL/L (ref 98–107)
CLARITY UR: CLEAR
CO2 SERPL-SCNC: 24 MMOL/L (ref 22–29)
COLOR UR: YELLOW
CREAT SERPL-MCNC: 1.09 MG/DL (ref 0.57–1)
D-LACTATE SERPL-SCNC: 1.3 MMOL/L (ref 0.5–2)
DEPRECATED RDW RBC AUTO: 46.1 FL (ref 37–54)
EOSINOPHIL # BLD AUTO: 0.05 10*3/MM3 (ref 0–0.4)
EOSINOPHIL NFR BLD AUTO: 0.9 % (ref 0.3–6.2)
ERYTHROCYTE [DISTWIDTH] IN BLOOD BY AUTOMATED COUNT: 17.2 % (ref 12.3–15.4)
GFR SERPL CREATININE-BSD FRML MDRD: 62 ML/MIN/1.73
GLOBULIN UR ELPH-MCNC: 3.4 GM/DL
GLUCOSE BLDC GLUCOMTR-MCNC: 95 MG/DL (ref 70–130)
GLUCOSE SERPL-MCNC: 122 MG/DL (ref 65–99)
GLUCOSE UR STRIP-MCNC: ABNORMAL MG/DL
HCT VFR BLD AUTO: 44.5 % (ref 34–46.6)
HGB BLD-MCNC: 13.9 G/DL (ref 12–15.9)
HGB UR QL STRIP.AUTO: ABNORMAL
HYALINE CASTS UR QL AUTO: ABNORMAL /LPF
IMM GRANULOCYTES # BLD AUTO: 0.02 10*3/MM3 (ref 0–0.05)
IMM GRANULOCYTES NFR BLD AUTO: 0.4 % (ref 0–0.5)
KETONES UR QL STRIP: NEGATIVE
LEUKOCYTE ESTERASE UR QL STRIP.AUTO: ABNORMAL
LIPASE SERPL-CCNC: 110 U/L (ref 13–60)
LYMPHOCYTES # BLD AUTO: 1.41 10*3/MM3 (ref 0.7–3.1)
LYMPHOCYTES NFR BLD AUTO: 25.2 % (ref 19.6–45.3)
MCH RBC QN AUTO: 23.8 PG (ref 26.6–33)
MCHC RBC AUTO-ENTMCNC: 31.2 G/DL (ref 31.5–35.7)
MCV RBC AUTO: 76.3 FL (ref 79–97)
MONOCYTES # BLD AUTO: 0.51 10*3/MM3 (ref 0.1–0.9)
MONOCYTES NFR BLD AUTO: 9.1 % (ref 5–12)
NEUTROPHILS NFR BLD AUTO: 3.57 10*3/MM3 (ref 1.7–7)
NEUTROPHILS NFR BLD AUTO: 63.9 % (ref 42.7–76)
NITRITE UR QL STRIP: NEGATIVE
NRBC BLD AUTO-RTO: 0 /100 WBC (ref 0–0.2)
PH UR STRIP.AUTO: <=5 [PH] (ref 5–8)
PLATELET # BLD AUTO: 297 10*3/MM3 (ref 140–450)
PMV BLD AUTO: 9.8 FL (ref 6–12)
POTASSIUM SERPL-SCNC: 5 MMOL/L (ref 3.5–5.2)
PROT SERPL-MCNC: 8.3 G/DL (ref 6–8.5)
PROT UR QL STRIP: NEGATIVE
RBC # BLD AUTO: 5.83 10*6/MM3 (ref 3.77–5.28)
RBC # UR STRIP: ABNORMAL /HPF
REF LAB TEST METHOD: ABNORMAL
SARS-COV-2 RNA PNL SPEC NAA+PROBE: NOT DETECTED
SODIUM SERPL-SCNC: 139 MMOL/L (ref 136–145)
SP GR UR STRIP: 1.03 (ref 1–1.03)
SQUAMOUS #/AREA URNS HPF: ABNORMAL /HPF
UROBILINOGEN UR QL STRIP: ABNORMAL
WBC # UR STRIP: ABNORMAL /HPF
WBC NRBC COR # BLD: 5.59 10*3/MM3 (ref 3.4–10.8)

## 2022-01-10 PROCEDURE — 83605 ASSAY OF LACTIC ACID: CPT | Performed by: NURSE PRACTITIONER

## 2022-01-10 PROCEDURE — 83690 ASSAY OF LIPASE: CPT | Performed by: NURSE PRACTITIONER

## 2022-01-10 PROCEDURE — 87635 SARS-COV-2 COVID-19 AMP PRB: CPT | Performed by: NURSE PRACTITIONER

## 2022-01-10 PROCEDURE — 96366 THER/PROPH/DIAG IV INF ADDON: CPT

## 2022-01-10 PROCEDURE — 96365 THER/PROPH/DIAG IV INF INIT: CPT

## 2022-01-10 PROCEDURE — 82962 GLUCOSE BLOOD TEST: CPT

## 2022-01-10 PROCEDURE — 81001 URINALYSIS AUTO W/SCOPE: CPT | Performed by: NURSE PRACTITIONER

## 2022-01-10 PROCEDURE — 74177 CT ABD & PELVIS W/CONTRAST: CPT

## 2022-01-10 PROCEDURE — 85025 COMPLETE CBC W/AUTO DIFF WBC: CPT | Performed by: NURSE PRACTITIONER

## 2022-01-10 PROCEDURE — 82150 ASSAY OF AMYLASE: CPT | Performed by: NURSE PRACTITIONER

## 2022-01-10 PROCEDURE — 25010000002 ONDANSETRON PER 1 MG: Performed by: NURSE PRACTITIONER

## 2022-01-10 PROCEDURE — 99284 EMERGENCY DEPT VISIT MOD MDM: CPT

## 2022-01-10 PROCEDURE — 80053 COMPREHEN METABOLIC PANEL: CPT | Performed by: NURSE PRACTITIONER

## 2022-01-10 PROCEDURE — 25010000002 IOPAMIDOL 61 % SOLUTION: Performed by: NURSE PRACTITIONER

## 2022-01-10 RX ORDER — ONDANSETRON 4 MG/1
4 TABLET, ORALLY DISINTEGRATING ORAL EVERY 6 HOURS PRN
Qty: 10 TABLET | Refills: 0 | Status: SHIPPED | OUTPATIENT
Start: 2022-01-10

## 2022-01-10 RX ORDER — ONDANSETRON HCL IN 0.9 % NACL 8 MG/50 ML
8 INTRAVENOUS SOLUTION, PIGGYBACK (ML) INTRAVENOUS ONCE
Status: COMPLETED | OUTPATIENT
Start: 2022-01-10 | End: 2022-01-10

## 2022-01-10 RX ORDER — ONDANSETRON 2 MG/ML
8 INJECTION INTRAMUSCULAR; INTRAVENOUS ONCE
Status: DISCONTINUED | OUTPATIENT
Start: 2022-01-10 | End: 2022-01-10

## 2022-01-10 RX ADMIN — ONDANSETRON 8 MG: 2 INJECTION INTRAMUSCULAR; INTRAVENOUS at 19:03

## 2022-01-10 RX ADMIN — SODIUM CHLORIDE, POTASSIUM CHLORIDE, SODIUM LACTATE AND CALCIUM CHLORIDE 1000 ML: 600; 310; 30; 20 INJECTION, SOLUTION INTRAVENOUS at 18:58

## 2022-01-10 RX ADMIN — IOPAMIDOL 100 ML: 612 INJECTION, SOLUTION INTRAVENOUS at 20:38

## 2022-01-10 NOTE — TELEPHONE ENCOUNTER
Pt called and says her trulicity or Jardiance is making her extremely sick. She is vomiting and nauseous. Please call pt to advise. Thank you

## 2022-01-11 ENCOUNTER — TELEPHONE (OUTPATIENT)
Dept: ENDOCRINOLOGY | Facility: CLINIC | Age: 62
End: 2022-01-11

## 2022-01-11 NOTE — TELEPHONE ENCOUNTER
Pt went to the ER last night due to being extremely sick with the Trulicity and Jardiance. The ER told her to stop taking these. Please call pt back to advise. Thank you

## 2022-01-11 NOTE — DISCHARGE INSTRUCTIONS
Return to ER if symptoms worsen   Clear liquids only for 24 hours, then advance as tolerated  Follow up with dr connelly tomorrow
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
## 2022-01-14 NOTE — PROGRESS NOTES
Patient wanted to continue using her home pharmacy. We moved her rx over. I did educate her on each medication. I provided my contact info if she were to need our help in the future.

## 2022-02-07 ENCOUNTER — TELEPHONE (OUTPATIENT)
Dept: ENDOCRINOLOGY | Facility: CLINIC | Age: 62
End: 2022-02-07

## 2022-02-07 NOTE — TELEPHONE ENCOUNTER
Patient called and sugar is elevated after Dr Anderson removed Trulicity. Pt wants to know if there is anything else she can take?    Thanks

## 2022-02-08 ENCOUNTER — TELEPHONE (OUTPATIENT)
Dept: ENDOCRINOLOGY | Facility: CLINIC | Age: 62
End: 2022-02-08

## 2022-02-08 ENCOUNTER — DOCUMENTATION (OUTPATIENT)
Dept: CT IMAGING | Facility: HOSPITAL | Age: 62
End: 2022-02-08

## 2022-02-08 NOTE — PROGRESS NOTES
Message and A notification letter was sent to the patient explaining that a recent imaging exam showed an incidental finding, for which follow-up testing was recommended.

## 2022-02-08 NOTE — TELEPHONE ENCOUNTER
Pt called again wanting an update on what medication to take next. Dr Anderson removed Trulicity but patient is still having high sugars. Please call patient as soon as possible. [phone]    Thanks

## 2022-02-22 ENCOUNTER — TELEPHONE (OUTPATIENT)
Dept: UROLOGY | Age: 62
End: 2022-02-22

## 2022-02-22 NOTE — TELEPHONE ENCOUNTER
Called both numbers on file and the primary number is disconnected, but left a VM on her 's phone stating her appt is rescheduled due to the provider being on sick.      New appt is 3/3 at 10:00am

## 2022-03-02 ENCOUNTER — TELEPHONE (OUTPATIENT)
Dept: UROLOGY | Age: 62
End: 2022-03-02

## 2022-03-02 NOTE — PROGRESS NOTES
Katy Briones is a 57 y.o. female who presents today   Chief Complaint   Patient presents with    New Patient     I am here today for kidney stones       Patient is a 26-year-old female who presents the clinic today with history of kidney stones. She was seen at her PCPs office approximately approximately 6 4 to 6 weeks ago with left flank pain. CT was obtained with no obstructing stone. She does have an 11 mm right renal stone that is nonobstructing. She does have a history of stones although has never required intervention in the past.  She also has a history of recurrent UTIs approximately every 3 months. Today she denies any flank pain, fever, chills, nausea, vomiting, lower urinary tract symptoms. She brings in CT on disc today. Past Medical History:   Diagnosis Date    Diabetes mellitus ([address]     Hyperlipidemia     Hypertension        Past Surgical History:   Procedure Laterality Date    APPENDECTOMY      CHOLECYSTECTOMY         Current Outpatient Medications   Medication Sig Dispense Refill    LYUMJEV KWIKPEN 100 UNIT/ML SOPN       Empagliflozin-metFORMIN HCl (SYNJARDY) 5-1000 MG TABS Take by mouth      atorvastatin (LIPITOR) 40 MG tablet atorvastatin 40 mg tablet      lisinopril (PRINIVIL;ZESTRIL) 20 MG tablet Take 20 mg by mouth daily      amLODIPine (NORVASC) 5 MG tablet Take 5 mg by mouth daily      aspirin 325 MG tablet Take 325 mg by mouth daily      Insulin Degludec (TRESIBA) 100 UNIT/ML SOLN Inject into the skin       No current facility-administered medications for this visit.        No Known Allergies    Social History     Socioeconomic History    Marital status:      Spouse name: None    Number of children: None    Years of education: None    Highest education level: None   Occupational History    None   Tobacco Use    Smoking status: Current Every Day Smoker     Packs/day: 0.50     Types: Cigarettes    Smokeless tobacco: Never Used   Vaping Use    Vaping Use: Never used   Substance and Sexual Activity    Alcohol use: Never    Drug use: Never    Sexual activity: None   Other Topics Concern    None   Social History Narrative    None     Social Determinants of Health     Financial Resource Strain:     Difficulty of Paying Living Expenses: Not on file   Food Insecurity:     Worried About Running Out of Food in the Last Year: Not on file    Sydni of Food in the Last Year: Not on file   Transportation Needs:     Lack of Transportation (Medical): Not on file    Lack of Transportation (Non-Medical): Not on file   Physical Activity:     Days of Exercise per Week: Not on file    Minutes of Exercise per Session: Not on file   Stress:     Feeling of Stress : Not on file   Social Connections:     Frequency of Communication with Friends and Family: Not on file    Frequency of Social Gatherings with Friends and Family: Not on file    Attends Pentecostal Services: Not on file    Active Member of [address] [place] [address] or Organizations: Not on file    Attends Club or Organization Meetings: Not on file    Marital Status: Not on file   Intimate Partner Violence:     Fear of Current or Ex-Partner: Not on file    Emotionally Abused: Not on file    Physically Abused: Not on file    Sexually Abused: Not on file   Housing Stability:     Unable to Pay for Housing in the Last Year: Not on file    Number of [place] in the Last Year: Not on file    Unstable Housing in the Last Year: Not on file       No family history on file. REVIEW OF SYSTEMS:  Review of Systems   Constitutional: Negative for chills and fever. Gastrointestinal: Negative for abdominal distention, abdominal pain, nausea and vomiting. Genitourinary: Negative for difficulty urinating, dysuria, flank pain, frequency, hematuria and urgency. Musculoskeletal: Negative for back pain and gait problem. Psychiatric/Behavioral: Negative for agitation and confusion.        PHYSICAL EXAM:  /79   Temp 97.5 °F (36.4 °C) (Temporal)   Ht 5' 1\" (1.549 m)   Wt 165 lb 3.2 oz (74.9 kg)   BMI 31.21 kg/m²   Physical Exam  Vitals and nursing note reviewed. Constitutional:       General: She is not in acute distress. Appearance: Normal appearance. She is not ill-appearing. Pulmonary:      Effort: Pulmonary effort is normal. No respiratory distress. Abdominal:      General: There is no distension. Tenderness: There is no abdominal tenderness. There is no right CVA tenderness or left CVA tenderness. Neurological:      Mental Status: She is alert and oriented to person, place, and time. Mental status is at baseline. Psychiatric:         Mood and Affect: Mood normal.         Behavior: Behavior normal.       DATA:    Results for orders placed or performed in visit on 03/03/22   POCT Urinalysis no Micro   Result Value Ref Range    Color, UA YELLOW     Clarity, UA CLEAR     Glucose, UA POC 1,000     Bilirubin, UA NEG     Ketones, UA NEG     Spec Grav, UA 1.020     Blood, UA POC NEG     pH, UA 5.0     Protein, UA POC NEG     Urobilinogen, UA 0.2     Leukocytes, UA NEG     Nitrite, UA NEG     Appearance, Fluid Clear Clear, Slightly Cloudy         IMAGING:  I reviewed CT brought in on disc today. She does have a right 11 mm stone that is nonobstructing. No hydronephrosis no clearly obstructing stones. She does have a questionable stone in her right proximal ureter although does not appear obstructing. Unable to really differentiate if this is in her ureter. 1. Right nephrolithiasis  Right nephrolithiasis noted on CT  On disc from January 2022. Unable to differentiate if she does have a stone in the right proximal ureter although she was having previous of left flank pain. We will go ahead and obtain a CT urogram to further assess if this is a nonobstructing ureteral stone as well. We will also get KUB for possible ESWL on right nonobstructing stone.     - POCT Urinalysis no Micro  - CT ABDOMEN PELVIS W WO CONTRAST Additional Contrast? Radiologist Recommendation (Urogram Protocol); Future  - XR ABDOMEN (KUB) (SINGLE AP VIEW); Future    2. Flank pain  Experienced flank pain in January 2022. She is currently asymptomatic although this was on the left side and I do not see any obstructing stone within the left. We will go ahead and evaluate with CT urogram to ensure no nonobstructing stone in the right proximal ureter.    - CT ABDOMEN PELVIS W WO CONTRAST Additional Contrast? Radiologist Recommendation (Urogram Protocol); Future    3. History of kidney stones    - CT ABDOMEN PELVIS W WO CONTRAST Additional Contrast? Radiologist Recommendation (Urogram Protocol); Future      Orders Placed This Encounter   Procedures    CT ABDOMEN PELVIS W WO CONTRAST Additional Contrast? Radiologist Recommendation (Urogram Protocol)     Standing Status:   Future     Standing Expiration Date:   3/3/2023     Scheduling Instructions:      CT for Urogram protocol     Order Specific Question:   Additional Contrast?     Answer:   Radiologist Recommendation     Comments:   Urogram Protocol     Order Specific Question:   STAT Creatinine as needed:     Answer: Yes    XR ABDOMEN (KUB) (SINGLE AP VIEW)     Standing Status:   Future     Standing Expiration Date:   3/3/2023    POCT Urinalysis no Micro        Return in about 1 week (around 3/10/2022) for CT prior, KUB prior. All information inputted into the note by the MA to include chief complaint, past medical history, past surgical history, medications, allergies, social and family history and review of systems has been reviewed and updated as needed by me. EMR Dragon/transcription disclaimer: Much of this documentt is electronic  transcription/translation of spoken language to printed text. The  electronic translation of spoken language may be erroneous, or at times,  nonsensical words or phrases may be inadvertently transcribed.  Although I  have reviewed the document for such errors, some may still exist.

## 2022-03-03 ENCOUNTER — OFFICE VISIT (OUTPATIENT)
Dept: UROLOGY | Age: 62
End: 2022-03-03
Payer: COMMERCIAL

## 2022-03-03 VITALS
DIASTOLIC BLOOD PRESSURE: 79 MMHG | TEMPERATURE: 97.5 F | SYSTOLIC BLOOD PRESSURE: 128 MMHG | HEIGHT: 61 IN | BODY MASS INDEX: 31.19 KG/M2 | WEIGHT: 165.2 LBS

## 2022-03-03 DIAGNOSIS — Z87.442 HISTORY OF KIDNEY STONES: ICD-10-CM

## 2022-03-03 DIAGNOSIS — N20.0 RIGHT NEPHROLITHIASIS: Primary | ICD-10-CM

## 2022-03-03 DIAGNOSIS — R10.9 FLANK PAIN: ICD-10-CM

## 2022-03-03 LAB
APPEARANCE FLUID: CLEAR
BILIRUBIN, POC: NORMAL
BLOOD URINE, POC: NORMAL
CLARITY, POC: CLEAR
COLOR, POC: YELLOW
GLUCOSE URINE, POC: 1000
KETONES, POC: NORMAL
LEUKOCYTE EST, POC: NORMAL
NITRITE, POC: NORMAL
PH, POC: 5
PROTEIN, POC: NORMAL
SPECIFIC GRAVITY, POC: 1.02
UROBILINOGEN, POC: 0.2

## 2022-03-03 PROCEDURE — 3017F COLORECTAL CA SCREEN DOC REV: CPT | Performed by: NURSE PRACTITIONER

## 2022-03-03 PROCEDURE — G8427 DOCREV CUR MEDS BY ELIG CLIN: HCPCS | Performed by: NURSE PRACTITIONER

## 2022-03-03 PROCEDURE — 99204 OFFICE O/P NEW MOD 45 MIN: CPT | Performed by: NURSE PRACTITIONER

## 2022-03-03 PROCEDURE — G8417 CALC BMI ABV UP PARAM F/U: HCPCS | Performed by: NURSE PRACTITIONER

## 2022-03-03 PROCEDURE — 81002 URINALYSIS NONAUTO W/O SCOPE: CPT | Performed by: NURSE PRACTITIONER

## 2022-03-03 PROCEDURE — G8484 FLU IMMUNIZE NO ADMIN: HCPCS | Performed by: NURSE PRACTITIONER

## 2022-03-03 PROCEDURE — 4004F PT TOBACCO SCREEN RCVD TLK: CPT | Performed by: NURSE PRACTITIONER

## 2022-03-03 RX ORDER — EMPAGLIFLOZIN AND METFORMIN HYDROCHLORIDE 5; 1000 MG/1; MG/1
TABLET ORAL
Status: ON HOLD | COMMUNITY
End: 2022-03-24 | Stop reason: ALTCHOICE

## 2022-03-03 RX ORDER — INSULIN LISPRO-AABC 100 [IU]/ML
15 INJECTION, SOLUTION SUBCUTANEOUS
COMMUNITY
Start: 2021-12-24

## 2022-03-04 ASSESSMENT — ENCOUNTER SYMPTOMS
VOMITING: 0
BACK PAIN: 0
NAUSEA: 0
ABDOMINAL DISTENTION: 0
ABDOMINAL PAIN: 0

## 2022-03-07 ENCOUNTER — PREP FOR PROCEDURE (OUTPATIENT)
Dept: UROLOGY | Age: 62
End: 2022-03-07

## 2022-03-07 ENCOUNTER — HOSPITAL ENCOUNTER (OUTPATIENT)
Dept: CT IMAGING | Age: 62
Discharge: HOME | End: 2022-03-07
Payer: COMMERCIAL

## 2022-03-07 ENCOUNTER — OFFICE VISIT (OUTPATIENT)
Dept: UROLOGY | Age: 62
End: 2022-03-07
Payer: COMMERCIAL

## 2022-03-07 ENCOUNTER — HOSPITAL ENCOUNTER (OUTPATIENT)
Dept: GENERAL RADIOLOGY | Age: 62
Discharge: HOME | End: 2022-03-07
Payer: COMMERCIAL

## 2022-03-07 VITALS
WEIGHT: 168 LBS | DIASTOLIC BLOOD PRESSURE: 80 MMHG | HEIGHT: 61 IN | SYSTOLIC BLOOD PRESSURE: 134 MMHG | TEMPERATURE: 97.2 F | BODY MASS INDEX: 31.72 KG/M2

## 2022-03-07 DIAGNOSIS — R10.9 FLANK PAIN: ICD-10-CM

## 2022-03-07 DIAGNOSIS — N20.0 RIGHT NEPHROLITHIASIS: ICD-10-CM

## 2022-03-07 DIAGNOSIS — R31.0 GROSS HEMATURIA: Primary | ICD-10-CM

## 2022-03-07 DIAGNOSIS — N20.0 KIDNEY STONES: ICD-10-CM

## 2022-03-07 DIAGNOSIS — N20.0 RIGHT RENAL STONE: ICD-10-CM

## 2022-03-07 DIAGNOSIS — R10.9 FLANK PAIN: Primary | ICD-10-CM

## 2022-03-07 DIAGNOSIS — Z87.442 HISTORY OF KIDNEY STONES: ICD-10-CM

## 2022-03-07 PROCEDURE — 74178 CT ABD&PLV WO CNTR FLWD CNTR: CPT

## 2022-03-07 PROCEDURE — G8417 CALC BMI ABV UP PARAM F/U: HCPCS | Performed by: NURSE PRACTITIONER

## 2022-03-07 PROCEDURE — 99214 OFFICE O/P EST MOD 30 MIN: CPT | Performed by: NURSE PRACTITIONER

## 2022-03-07 PROCEDURE — 74018 RADEX ABDOMEN 1 VIEW: CPT

## 2022-03-07 PROCEDURE — G8427 DOCREV CUR MEDS BY ELIG CLIN: HCPCS | Performed by: NURSE PRACTITIONER

## 2022-03-07 PROCEDURE — G8484 FLU IMMUNIZE NO ADMIN: HCPCS | Performed by: NURSE PRACTITIONER

## 2022-03-07 PROCEDURE — 4004F PT TOBACCO SCREEN RCVD TLK: CPT | Performed by: NURSE PRACTITIONER

## 2022-03-07 PROCEDURE — 3017F COLORECTAL CA SCREEN DOC REV: CPT | Performed by: NURSE PRACTITIONER

## 2022-03-07 PROCEDURE — 6360000004 HC RX CONTRAST MEDICATION: Performed by: NURSE PRACTITIONER

## 2022-03-07 RX ORDER — PHENAZOPYRIDINE HYDROCHLORIDE 200 MG/1
TABLET, FILM COATED ORAL
COMMUNITY
Start: 2022-03-07 | End: 2022-03-07 | Stop reason: ALTCHOICE

## 2022-03-07 RX ORDER — SULFAMETHOXAZOLE AND TRIMETHOPRIM 800; 160 MG/1; MG/1
1 TABLET ORAL 2 TIMES DAILY
Qty: 20 TABLET | Refills: 0 | Status: SHIPPED | OUTPATIENT
Start: 2022-03-07 | End: 2022-03-17

## 2022-03-07 RX ORDER — CEFUROXIME AXETIL 250 MG/1
TABLET ORAL
COMMUNITY
Start: 2022-03-07 | End: 2022-03-22

## 2022-03-07 RX ORDER — PHENAZOPYRIDINE HYDROCHLORIDE 200 MG/1
200 TABLET, FILM COATED ORAL 3 TIMES DAILY PRN
Qty: 10 TABLET | Refills: 0 | Status: SHIPPED | OUTPATIENT
Start: 2022-03-07 | End: 2022-03-10

## 2022-03-07 RX ADMIN — IOPAMIDOL 90 ML: 755 INJECTION, SOLUTION INTRAVENOUS at 09:59

## 2022-03-07 ASSESSMENT — ENCOUNTER SYMPTOMS
BACK PAIN: 0
BACK PAIN: 0
VOMITING: 0
ABDOMINAL PAIN: 0
NAUSEA: 0
ABDOMINAL DISTENTION: 0
NAUSEA: 0
VOMITING: 0
ABDOMINAL DISTENTION: 0
ABDOMINAL PAIN: 0

## 2022-03-07 NOTE — H&P
Noelle Fitch is a 57 y.o., female, Established patient who presents today   No chief complaint on file. HPI   Patient presents for further evaluation of gross hematuria. She reports presenting to [facility] yesterday for evaluation when the problem first started. She has been seen previously in our office for evaluation of left flank pain. She did have a CT urogram completed today. She reports some dysuria, suprapubic pain, and has noted clots in her urine yesterday and today. She denies any fevers, chills, nausea, vomiting. She has a known right lower pole stone that was identified on outside imaging. Again, she has had a CT urogram this morning for review. She reports a remote history of nephrolithiasis, although she has never required any surgical intervention. Previous stone composition is unknown. REVIEW OF SYSTEMS:  Review of Systems   Constitutional: Negative for chills and fever. Gastrointestinal: Negative for abdominal distention, abdominal pain, nausea and vomiting. Genitourinary: Positive for dysuria, hematuria and pelvic pain. Negative for difficulty urinating, flank pain, frequency and urgency. Musculoskeletal: Negative for back pain and gait problem. Psychiatric/Behavioral: Negative for agitation and confusion.        Past Medical History:   Diagnosis Date    Diabetes mellitus ([address]     Hyperlipidemia     Hypertension        Past Surgical History:   Procedure Laterality Date    APPENDECTOMY      CHOLECYSTECTOMY         Current Outpatient Medications   Medication Sig Dispense Refill    cefUROXime (CEFTIN) 250 MG tablet       sulfamethoxazole-trimethoprim (BACTRIM DS;SEPTRA DS) 800-160 MG per tablet Take 1 tablet by mouth 2 times daily for 10 days 20 tablet 0    phenazopyridine (PYRIDIUM) 200 MG tablet Take 1 tablet by mouth 3 times daily as needed for Pain 10 tablet 0    LYUMJEV KWIKPEN 100 UNIT/ML SOPN       Empagliflozin-metFORMIN HCl (SYNJARDY) 5-1000 MG TABS Take by mouth      atorvastatin (LIPITOR) 40 MG tablet atorvastatin 40 mg tablet      lisinopril (PRINIVIL;ZESTRIL) 20 MG tablet Take 20 mg by mouth daily      amLODIPine (NORVASC) 5 MG tablet Take 5 mg by mouth daily      aspirin 325 MG tablet Take 325 mg by mouth daily      Insulin Degludec (TRESIBA) 100 UNIT/ML SOLN Inject into the skin       No current facility-administered medications for this visit. No Known Allergies    Social History     Socioeconomic History    Marital status:      Spouse name: Not on file    Number of children: Not on file    Years of education: Not on file    Highest education level: Not on file   Occupational History    Not on file   Tobacco Use    Smoking status: Current Every Day Smoker     Packs/day: 0.50     Types: Cigarettes    Smokeless tobacco: Never Used   Vaping Use    Vaping Use: Never used   Substance and Sexual Activity    Alcohol use: Never    Drug use: Never    Sexual activity: Not on file   Other Topics Concern    Not on file   Social History Narrative    Not on file     Social Determinants of Health     Financial Resource Strain:     Difficulty of Paying Living Expenses: Not on file   Food Insecurity:     Worried About [address] in the Last Year: Not on file    [address] in the Last Year: Not on file   Transportation Needs:     Lack of Transportation (Medical): Not on file    Lack of Transportation (Non-Medical):  Not on file   Physical Activity:     Days of Exercise per Week: Not on file    Minutes of Exercise per Session: Not on file   Stress:     Feeling of Stress : Not on file   Social Connections:     Frequency of Communication with Friends and Family: Not on file    Frequency of Social Gatherings with Friends and Family: Not on file    Attends Rastafari Services: Not on file    Active Member of Clubs or Organizations: Not on file    Attends Club or Organization Meetings: Not on file    Marital Status: Not on file   Intimate Partner Violence:     Fear of Current or Ex-Partner: Not on file    Emotionally Abused: Not on file    Physically Abused: Not on file    Sexually Abused: Not on file   Housing Stability:     Unable to Pay for Housing in the Last Year: Not on file    Number of [place] in the Last Year: Not on file    Unstable Housing in the Last Year: Not on file       No family history on file. PHYSICAL EXAM:  There were no vitals taken for this visit. Physical Exam  Vitals and nursing note reviewed. Constitutional:       General: She is not in acute distress. Appearance: Normal appearance. She is not ill-appearing. Pulmonary:      Effort: Pulmonary effort is normal. No respiratory distress. Abdominal:      General: There is no distension. Tenderness: There is no abdominal tenderness. There is no right CVA tenderness or left CVA tenderness. Neurological:      Mental Status: She is alert and oriented to person, place, and time. Mental status is at baseline. Psychiatric:         Mood and Affect: Mood normal.         Behavior: Behavior normal.         DATA:  No results found for this visit on 03/07/22. IMAGING:  Impression   1. 16 mm calculus lower pole the right kidney. 2. Small hemangioma left lobe of the liver. 3. Vascular calcifications noted in the abdominal aorta. Signed by Dr Vic Pepper     Impression   1. Right nephrolithiasis. There is an 8 mm calculus projecting over   lower pole right kidney. Signed by Dr Vic Pepper       I have reviewed the CT and KUB images and agree with radiologist interpretation. There is a large calculus in the lower pole of the right kidney that is nonobstructing. The bladder wall does appear to be somewhat symmetrically thickened as well. This could be secondary to cystitis or just an under distended bladder. No other obvious findings of urinary tract.   The stone is also visible on KUB although it does appear smaller. ASSESSMENT/PLAN  1. Gross hematuria  Hematuria is explained to the patient as blood in the urine, gross (visible to the naked eye) or microscopic. In many patients, the condition is benign and of no consequence. However, it may be related to an underlying cause including but not limited to malignancies or infections of the urinary system, renal mass, nephrolithiasis. Evaluation options have been discussed and explained to patient. Taking into account her medical, family, and social history, this patient falls into a high category. We will proceed with cystoscopy. This can be performed in the operating room as Dr. Epi Holder will likely have to place a stent while treating her large right renal stone. We have sent a catheterized urine for culture today to make sure she is adequately treated prior to any surgical intervention. I have empirically started her on Bactrim given the severity of her symptoms. - sulfamethoxazole-trimethoprim (BACTRIM DS;SEPTRA DS) 800-160 MG per tablet; Take 1 tablet by mouth 2 times daily for 10 days  Dispense: 20 tablet; Refill: 0  - phenazopyridine (PYRIDIUM) 200 MG tablet; Take 1 tablet by mouth 3 times daily as needed for Pain  Dispense: 10 tablet; Refill: 0  - Culture, Urine    2. Right renal stone  Patient presents with a right renal stone. Based on the stone location and severity of symptoms the patient has elected to proceed with ESWL with possible ureteral stent placement. We have discussed other modalities of treatment, including but not limited to a trial of medical expulsion therapy, ureteroscopy with laser lithotripsy, and monitoring the stone with subsequent imaging.   Risks of the procedure were discussed which include but are not limited to bleeding, infection, postprocedural pain, damage to the kidney or surrounding organs, incomplete stone fragmentation or inability to pass stone fragments possibly causing urinary obstruction and resulting in need for additional/subsequent procedures or ureteral stenting. The patient has been provided with preoperative arrival times/lab work and has been instructed to strain urine prior to and after the procedure to collect stone fragments to send for analysis. No orders of the defined types were placed in this encounter. No follow-ups on file. An electronic signature was used to authenticate this note. NADIA CELESTE - CNP    All information inputted into the note by the MA to include chief complaint, past medical history, past surgical history, medications, allergies, social and family history and review of systems has been reviewed and updated as needed by me. EMR Dragon/transcription disclaimer: Much of this document is electronic transcription/translation of spoken language to printed text. The electronic translation of spoken language may be erroneous or, at times, nonsensical words or phrases may be inadvertently transcribed.  Although I have reviewed the document for such errors, some may still exist.

## 2022-03-07 NOTE — H&P (VIEW-ONLY)
Nikita Braga is a 57 y.o., female, Established patient who presents today   No chief complaint on file. HPI   Patient presents for further evaluation of gross hematuria. She reports presenting to [facility] yesterday for evaluation when the problem first started. She has been seen previously in our office for evaluation of left flank pain. She did have a CT urogram completed today. She reports some dysuria, suprapubic pain, and has noted clots in her urine yesterday and today. She denies any fevers, chills, nausea, vomiting. She has a known right lower pole stone that was identified on outside imaging. Again, she has had a CT urogram this morning for review. She reports a remote history of nephrolithiasis, although she has never required any surgical intervention. Previous stone composition is unknown. REVIEW OF SYSTEMS:  Review of Systems   Constitutional: Negative for chills and fever. Gastrointestinal: Negative for abdominal distention, abdominal pain, nausea and vomiting. Genitourinary: Positive for dysuria, hematuria and pelvic pain. Negative for difficulty urinating, flank pain, frequency and urgency. Musculoskeletal: Negative for back pain and gait problem. Psychiatric/Behavioral: Negative for agitation and confusion.        Past Medical History:   Diagnosis Date    Diabetes mellitus ([address]     Hyperlipidemia     Hypertension        Past Surgical History:   Procedure Laterality Date    APPENDECTOMY      CHOLECYSTECTOMY         Current Outpatient Medications   Medication Sig Dispense Refill    cefUROXime (CEFTIN) 250 MG tablet       sulfamethoxazole-trimethoprim (BACTRIM DS;SEPTRA DS) 800-160 MG per tablet Take 1 tablet by mouth 2 times daily for 10 days 20 tablet 0    phenazopyridine (PYRIDIUM) 200 MG tablet Take 1 tablet by mouth 3 times daily as needed for Pain 10 tablet 0    LYUMJEV KWIKPEN 100 UNIT/ML SOPN       Empagliflozin-metFORMIN HCl (SYNJARDY) 5-1000 MG TABS Take by mouth      atorvastatin (LIPITOR) 40 MG tablet atorvastatin 40 mg tablet      lisinopril (PRINIVIL;ZESTRIL) 20 MG tablet Take 20 mg by mouth daily      amLODIPine (NORVASC) 5 MG tablet Take 5 mg by mouth daily      aspirin 325 MG tablet Take 325 mg by mouth daily      Insulin Degludec (TRESIBA) 100 UNIT/ML SOLN Inject into the skin       No current facility-administered medications for this visit. No Known Allergies    Social History     Socioeconomic History    Marital status:      Spouse name: Not on file    Number of children: Not on file    Years of education: Not on file    Highest education level: Not on file   Occupational History    Not on file   Tobacco Use    Smoking status: Current Every Day Smoker     Packs/day: 0.50     Types: Cigarettes    Smokeless tobacco: Never Used   Vaping Use    Vaping Use: Never used   Substance and Sexual Activity    Alcohol use: Never    Drug use: Never    Sexual activity: Not on file   Other Topics Concern    Not on file   Social History Narrative    Not on file     Social Determinants of Health     Financial Resource Strain:     Difficulty of Paying Living Expenses: Not on file   Food Insecurity:     Worried About [address] in the Last Year: Not on file    [address] in the Last Year: Not on file   Transportation Needs:     Lack of Transportation (Medical): Not on file    Lack of Transportation (Non-Medical):  Not on file   Physical Activity:     Days of Exercise per Week: Not on file    Minutes of Exercise per Session: Not on file   Stress:     Feeling of Stress : Not on file   Social Connections:     Frequency of Communication with Friends and Family: Not on file    Frequency of Social Gatherings with Friends and Family: Not on file    Attends [employer] Services: Not on file    Active Member of Clubs or Organizations: Not on file    Attends Club or Organization Meetings: Not on file    Marital Status: Not on file   Intimate Partner Violence:     Fear of Current or Ex-Partner: Not on file    Emotionally Abused: Not on file    Physically Abused: Not on file    Sexually Abused: Not on file   Housing Stability:     Unable to Pay for Housing in the Last Year: Not on file    Number of [place] in the Last Year: Not on file    Unstable Housing in the Last Year: Not on file       No family history on file. PHYSICAL EXAM:  There were no vitals taken for this visit. Physical Exam  Vitals and nursing note reviewed. Constitutional:       General: She is not in acute distress. Appearance: Normal appearance. She is not ill-appearing. Pulmonary:      Effort: Pulmonary effort is normal. No respiratory distress. Abdominal:      General: There is no distension. Tenderness: There is no abdominal tenderness. There is no right CVA tenderness or left CVA tenderness. Neurological:      Mental Status: She is alert and oriented to person, place, and time. Mental status is at baseline. Psychiatric:         Mood and Affect: Mood normal.         Behavior: Behavior normal.         DATA:  No results found for this visit on 03/07/22. IMAGING:  Impression   1. 16 mm calculus lower pole the right kidney. 2. Small hemangioma left lobe of the liver. 3. Vascular calcifications noted in the abdominal aorta. Signed by Dr Maritza Trent     Impression   1. Right nephrolithiasis. There is an 8 mm calculus projecting over   lower pole right kidney. Signed by Dr Maritza Trent       I have reviewed the CT and KUB images and agree with radiologist interpretation. There is a large calculus in the lower pole of the right kidney that is nonobstructing. The bladder wall does appear to be somewhat symmetrically thickened as well. This could be secondary to cystitis or just an under distended bladder. No other obvious findings of urinary tract.   The stone is also visible on KUB although it does appear smaller. ASSESSMENT/PLAN  1. Gross hematuria  Hematuria is explained to the patient as blood in the urine, gross (visible to the naked eye) or microscopic. In many patients, the condition is benign and of no consequence. However, it may be related to an underlying cause including but not limited to malignancies or infections of the urinary system, renal mass, nephrolithiasis. Evaluation options have been discussed and explained to patient. Taking into account her medical, family, and social history, this patient falls into a high category. We will proceed with cystoscopy. This can be performed in the operating room as Dr. Мария Griffith will likely have to place a stent while treating her large right renal stone. We have sent a catheterized urine for culture today to make sure she is adequately treated prior to any surgical intervention. I have empirically started her on Bactrim given the severity of her symptoms. - sulfamethoxazole-trimethoprim (BACTRIM DS;SEPTRA DS) 800-160 MG per tablet; Take 1 tablet by mouth 2 times daily for 10 days  Dispense: 20 tablet; Refill: 0  - phenazopyridine (PYRIDIUM) 200 MG tablet; Take 1 tablet by mouth 3 times daily as needed for Pain  Dispense: 10 tablet; Refill: 0  - Culture, Urine    2. Right renal stone  Patient presents with a right renal stone. Based on the stone location and severity of symptoms the patient has elected to proceed with ESWL with possible ureteral stent placement. We have discussed other modalities of treatment, including but not limited to a trial of medical expulsion therapy, ureteroscopy with laser lithotripsy, and monitoring the stone with subsequent imaging.   Risks of the procedure were discussed which include but are not limited to bleeding, infection, postprocedural pain, damage to the kidney or surrounding organs, incomplete stone fragmentation or inability to pass stone fragments possibly causing urinary obstruction and resulting in need for additional/subsequent procedures or ureteral stenting. The patient has been provided with preoperative arrival times/lab work and has been instructed to strain urine prior to and after the procedure to collect stone fragments to send for analysis. No orders of the defined types were placed in this encounter. No follow-ups on file. An electronic signature was used to authenticate this note. NADIA CELESTE - CNP    All information inputted into the note by the MA to include chief complaint, past medical history, past surgical history, medications, allergies, social and family history and review of systems has been reviewed and updated as needed by me. EMR Dragon/transcription disclaimer: Much of this document is electronic transcription/translation of spoken language to printed text. The electronic translation of spoken language may be erroneous or, at times, nonsensical words or phrases may be inadvertently transcribed.  Although I have reviewed the document for such errors, some may still exist.

## 2022-03-07 NOTE — PROGRESS NOTES
Solomon Juárez is a 57 y.o., female, Established patient who presents today   Chief Complaint   Patient presents with    Follow-up     I am here today for stones       HPI   Patient presents for further evaluation of gross hematuria. She reports presenting to [facility] yesterday for evaluation when the problem first started. She has been seen previously in our office for evaluation of left flank pain. She did have a CT urogram completed today. She reports some dysuria, suprapubic pain, and has noted clots in her urine yesterday and today. She denies any fevers, chills, nausea, vomiting. She has a known right lower pole stone that was identified on outside imaging. Again, she has had a CT urogram this morning for review. She reports a remote history of nephrolithiasis, although she has never required any surgical intervention. Previous stone composition is unknown. REVIEW OF SYSTEMS:  Review of Systems   Constitutional: Negative for chills and fever. Gastrointestinal: Negative for abdominal distention, abdominal pain, nausea and vomiting. Genitourinary: Positive for dysuria, hematuria and pelvic pain. Negative for difficulty urinating, flank pain, frequency and urgency. Musculoskeletal: Negative for back pain and gait problem. Psychiatric/Behavioral: Negative for agitation and confusion.        Past Medical History:   Diagnosis Date    Diabetes mellitus ([address]     Hyperlipidemia     Hypertension        Past Surgical History:   Procedure Laterality Date    APPENDECTOMY      CHOLECYSTECTOMY         Current Outpatient Medications   Medication Sig Dispense Refill    cefUROXime (CEFTIN) 250 MG tablet       sulfamethoxazole-trimethoprim (BACTRIM DS;SEPTRA DS) 800-160 MG per tablet Take 1 tablet by mouth 2 times daily for 10 days 20 tablet 0    phenazopyridine (PYRIDIUM) 200 MG tablet Take 1 tablet by mouth 3 times daily as needed for Pain 10 tablet 0    LYUMJEV KWIKPEN 100 UNIT/ML SOPN       Empagliflozin-metFORMIN HCl (SYNJARDY) 5-1000 MG TABS Take by mouth      atorvastatin (LIPITOR) 40 MG tablet atorvastatin 40 mg tablet      lisinopril (PRINIVIL;ZESTRIL) 20 MG tablet Take 20 mg by mouth daily      amLODIPine (NORVASC) 5 MG tablet Take 5 mg by mouth daily      aspirin 325 MG tablet Take 325 mg by mouth daily      Insulin Degludec (TRESIBA) 100 UNIT/ML SOLN Inject into the skin       No current facility-administered medications for this visit. No Known Allergies    Social History     Socioeconomic History    Marital status:      Spouse name: None    Number of children: None    Years of education: None    Highest education level: None   Occupational History    None   Tobacco Use    Smoking status: Current Every Day Smoker     Packs/day: 0.50     Types: Cigarettes    Smokeless tobacco: Never Used   Vaping Use    Vaping Use: Never used   Substance and Sexual Activity    Alcohol use: Never    Drug use: Never    Sexual activity: None   Other Topics Concern    None   Social History Narrative    None     Social Determinants of Health     Financial Resource Strain:     Difficulty of Paying Living Expenses: Not on file   Food Insecurity:     Worried About Running Out of Food in the Last Year: Not on file    Sydni of Food in the Last Year: Not on file   Transportation Needs:     Lack of Transportation (Medical): Not on file    Lack of Transportation (Non-Medical):  Not on file   Physical Activity:     Days of Exercise per Week: Not on file    Minutes of Exercise per Session: Not on file   Stress:     Feeling of Stress : Not on file   Social Connections:     Frequency of Communication with Friends and Family: Not on file    Frequency of Social Gatherings with Friends and Family: Not on file    Attends Pentecostal Services: Not on file    Active Member of Clubs or Organizations: Not on file    Attends Club or Organization Meetings: Not on file    Marital Status: Not on file   Intimate Partner Violence:     Fear of Current or Ex-Partner: Not on file    Emotionally Abused: Not on file    Physically Abused: Not on file    Sexually Abused: Not on file   Housing Stability:     Unable to Pay for Housing in the Last Year: Not on file    Number of [place] in the Last Year: Not on file    Unstable Housing in the Last Year: Not on file       History reviewed. No pertinent family history. PHYSICAL EXAM:  /80   Temp 97.2 °F (36.2 °C) (Temporal)   Ht 5' 1\" (1.549 m)   Wt 168 lb (76.2 kg)   BMI 31.74 kg/m²   Physical Exam  Vitals and nursing note reviewed. Constitutional:       General: She is not in acute distress. Appearance: Normal appearance. She is not ill-appearing. Pulmonary:      Effort: Pulmonary effort is normal. No respiratory distress. Abdominal:      General: There is no distension. Tenderness: There is no abdominal tenderness. There is no right CVA tenderness or left CVA tenderness. Neurological:      Mental Status: She is alert and oriented to person, place, and time. Mental status is at baseline. Psychiatric:         Mood and Affect: Mood normal.         Behavior: Behavior normal.         DATA:  No results found for this visit on 03/07/22. IMAGING:  Impression   1. 16 mm calculus lower pole the right kidney. 2. Small hemangioma left lobe of the liver. 3. Vascular calcifications noted in the abdominal aorta. Signed by Dr Komal Bhatt     I have reviewed the CT images and agree with radiologist interpretation. There is a large calculus in the lower pole of the right kidney that is nonobstructing. The bladder wall does appear to be somewhat symmetrically thickened as well. This could be secondary to cystitis or just an under distended bladder. No other obvious findings of urinary tract. ASSESSMENT/PLAN  1.  Gross hematuria  Hematuria is explained to the patient as blood in the urine, gross (visible to the naked eye) or microscopic. In many patients, the condition is benign and of no consequence. However, it may be related to an underlying cause including but not limited to malignancies or infections of the urinary system, renal mass, nephrolithiasis. Evaluation options have been discussed and explained to patient. Taking into account her medical, family, and social history, this patient falls into a high category. We will proceed with cystoscopy. This can be performed in the operating room as Dr. Мария Griffith will likely have to place a stent while treating her large right renal stone. We have sent a catheterized urine for culture today to make sure she is adequately treated prior to any surgical intervention. I have empirically started her on Bactrim given the severity of her symptoms. - sulfamethoxazole-trimethoprim (BACTRIM DS;SEPTRA DS) 800-160 MG per tablet; Take 1 tablet by mouth 2 times daily for 10 days  Dispense: 20 tablet; Refill: 0  - phenazopyridine (PYRIDIUM) 200 MG tablet; Take 1 tablet by mouth 3 times daily as needed for Pain  Dispense: 10 tablet; Refill: 0  - Culture, Urine    2. Right renal stone  Patient presents with a right renal stone. Based on the stone location and severity of symptoms the patient has elected to proceed with ESWL with possible ureteral stent placement. We have discussed other modalities of treatment, including but not limited to a trial of medical expulsion therapy, ureteroscopy with laser lithotripsy, and monitoring the stone with subsequent imaging. Risks of the procedure were discussed which include but are not limited to bleeding, infection, postprocedural pain, damage to the kidney or surrounding organs, incomplete stone fragmentation or inability to pass stone fragments possibly causing urinary obstruction and resulting in need for additional/subsequent procedures or ureteral stenting.     The patient has been provided with preoperative arrival times/lab work

## 2022-03-09 LAB — URINE CULTURE, ROUTINE: NORMAL

## 2022-03-22 ENCOUNTER — HOSPITAL ENCOUNTER (OUTPATIENT)
Dept: PREADMISSION TESTING | Age: 62
Discharge: HOME | End: 2022-03-26
Payer: COMMERCIAL

## 2022-03-22 VITALS — BODY MASS INDEX: 31.15 KG/M2 | HEIGHT: 61 IN | WEIGHT: 165 LBS

## 2022-03-22 LAB
ANION GAP SERPL CALCULATED.3IONS-SCNC: 14 MMOL/L (ref 7–19)
APTT: 27.8 SEC (ref 26–36.2)
BASOPHILS ABSOLUTE: 0 K/UL (ref 0–0.2)
BASOPHILS RELATIVE PERCENT: 0.9 % (ref 0–1)
BUN BLDV-MCNC: 16 MG/DL (ref 8–23)
CALCIUM SERPL-MCNC: 9.7 MG/DL (ref 8.8–10.2)
CHLORIDE BLD-SCNC: 103 MMOL/L (ref 98–111)
CO2: 24 MMOL/L (ref 22–29)
COLLAGEN EPINEPHRINE TIME: 98 SEC (ref 84–176)
CREAT SERPL-MCNC: 0.9 MG/DL (ref 0.5–0.9)
EKG P AXIS: 56 DEGREES
EKG P-R INTERVAL: 120 MS
EKG Q-T INTERVAL: 402 MS
EKG QRS DURATION: 96 MS
EKG QTC CALCULATION (BAZETT): 414 MS
EKG T AXIS: -10 DEGREES
EOSINOPHILS ABSOLUTE: 0.1 K/UL (ref 0–0.6)
EOSINOPHILS RELATIVE PERCENT: 1.9 % (ref 0–5)
GFR AFRICAN AMERICAN: >59
GFR NON-AFRICAN AMERICAN: >60
GLUCOSE BLD-MCNC: 160 MG/DL (ref 74–109)
HCT VFR BLD CALC: 42.1 % (ref 37–47)
HEMOGLOBIN: 12.5 G/DL (ref 12–16)
IMMATURE GRANULOCYTES #: 0 K/UL
INR BLD: 0.97 (ref 0.88–1.18)
LYMPHOCYTES ABSOLUTE: 1.2 K/UL (ref 1.1–4.5)
LYMPHOCYTES RELATIVE PERCENT: 27.8 % (ref 20–40)
MCH RBC QN AUTO: 23.5 PG (ref 27–31)
MCHC RBC AUTO-ENTMCNC: 29.7 G/DL (ref 33–37)
MCV RBC AUTO: 79.1 FL (ref 81–99)
MONOCYTES ABSOLUTE: 0.3 K/UL (ref 0–0.9)
MONOCYTES RELATIVE PERCENT: 6.3 % (ref 0–10)
NEUTROPHILS ABSOLUTE: 2.7 K/UL (ref 1.5–7.5)
NEUTROPHILS RELATIVE PERCENT: 62.9 % (ref 50–65)
PDW BLD-RTO: 17.5 % (ref 11.5–14.5)
PLATELET # BLD: 282 K/UL (ref 130–400)
PLATELET FUNCTION INTERPRETATION: NORMAL
PMV BLD AUTO: 10.6 FL (ref 9.4–12.3)
POTASSIUM SERPL-SCNC: 4.2 MMOL/L (ref 3.5–5)
PROTHROMBIN TIME: 13.1 SEC (ref 12–14.6)
RBC # BLD: 5.32 M/UL (ref 4.2–5.4)
SARS-COV-2, PCR: NOT DETECTED
SODIUM BLD-SCNC: 141 MMOL/L (ref 136–145)
WBC # BLD: 4.3 K/UL (ref 4.8–10.8)

## 2022-03-22 PROCEDURE — 85610 PROTHROMBIN TIME: CPT

## 2022-03-22 PROCEDURE — 85025 COMPLETE CBC W/AUTO DIFF WBC: CPT

## 2022-03-22 PROCEDURE — U0003 INFECTIOUS AGENT DETECTION BY NUCLEIC ACID (DNA OR RNA); SEVERE ACUTE RESPIRATORY SYNDROME CORONAVIRUS 2 (SARS-COV-2) (CORONAVIRUS DISEASE [COVID-19]), AMPLIFIED PROBE TECHNIQUE, MAKING USE OF HIGH THROUGHPUT TECHNOLOGIES AS DESCRIBED BY CMS-2020-01-R: HCPCS

## 2022-03-22 PROCEDURE — U0005 INFEC AGEN DETEC AMPLI PROBE: HCPCS

## 2022-03-22 PROCEDURE — 93005 ELECTROCARDIOGRAM TRACING: CPT | Performed by: NURSE PRACTITIONER

## 2022-03-22 PROCEDURE — 93010 ELECTROCARDIOGRAM REPORT: CPT | Performed by: INTERNAL MEDICINE

## 2022-03-22 PROCEDURE — 85730 THROMBOPLASTIN TIME PARTIAL: CPT

## 2022-03-22 PROCEDURE — 85576 BLOOD PLATELET AGGREGATION: CPT

## 2022-03-22 PROCEDURE — 80048 BASIC METABOLIC PNL TOTAL CA: CPT

## 2022-03-22 RX ORDER — LEVOFLOXACIN 500 MG/1
500 TABLET, FILM COATED ORAL DAILY
Status: CANCELLED | OUTPATIENT
Start: 2022-03-24

## 2022-03-22 NOTE — PROGRESS NOTES
Handwritten instructions given to patient as printer services were down. Patient verbalizes understanding of instructions and has no questions at this time.

## 2022-03-24 ENCOUNTER — ANESTHESIA EVENT (OUTPATIENT)
Dept: OPERATING ROOM | Age: 62
End: 2022-03-24
Payer: COMMERCIAL

## 2022-03-24 ENCOUNTER — HOSPITAL ENCOUNTER (OUTPATIENT)
Age: 62
Setting detail: OBSERVATION
Discharge: HOME | End: 2022-03-25
Attending: UROLOGY | Admitting: UROLOGY
Payer: COMMERCIAL

## 2022-03-24 ENCOUNTER — APPOINTMENT (OUTPATIENT)
Dept: GENERAL RADIOLOGY | Age: 62
End: 2022-03-24
Attending: UROLOGY
Payer: COMMERCIAL

## 2022-03-24 ENCOUNTER — ANESTHESIA (OUTPATIENT)
Dept: OPERATING ROOM | Age: 62
End: 2022-03-24
Payer: COMMERCIAL

## 2022-03-24 VITALS
RESPIRATION RATE: 11 BRPM | SYSTOLIC BLOOD PRESSURE: 91 MMHG | OXYGEN SATURATION: 92 % | DIASTOLIC BLOOD PRESSURE: 56 MMHG | TEMPERATURE: 97 F

## 2022-03-24 DIAGNOSIS — N20.0 RENAL CALCULUS, RIGHT: Primary | ICD-10-CM

## 2022-03-24 PROBLEM — I95.81 POSTOPERATIVE HYPOTENSION: Status: ACTIVE | Noted: 2022-03-24

## 2022-03-24 LAB
ANION GAP SERPL CALCULATED.3IONS-SCNC: 11 MMOL/L (ref 7–19)
BASOPHILS ABSOLUTE: 0 K/UL (ref 0–0.2)
BASOPHILS RELATIVE PERCENT: 0.3 % (ref 0–1)
BUN BLDV-MCNC: 13 MG/DL (ref 8–23)
CALCIUM SERPL-MCNC: 7.8 MG/DL (ref 8.8–10.2)
CHLORIDE BLD-SCNC: 105 MMOL/L (ref 98–111)
CO2: 23 MMOL/L (ref 22–29)
CREAT SERPL-MCNC: 0.9 MG/DL (ref 0.5–0.9)
EOSINOPHILS ABSOLUTE: 0 K/UL (ref 0–0.6)
EOSINOPHILS RELATIVE PERCENT: 0.1 % (ref 0–5)
GFR AFRICAN AMERICAN: >59
GFR NON-AFRICAN AMERICAN: >60
GLUCOSE BLD-MCNC: 227 MG/DL (ref 70–99)
GLUCOSE BLD-MCNC: 249 MG/DL (ref 70–99)
GLUCOSE BLD-MCNC: 284 MG/DL (ref 74–109)
GLUCOSE BLD-MCNC: 362 MG/DL (ref 70–99)
HBA1C MFR BLD: 8.6 % (ref 4–6)
HCT VFR BLD CALC: 38.9 % (ref 37–47)
HCT VFR BLD CALC: 40.6 % (ref 37–47)
HEMOGLOBIN: 11.7 G/DL (ref 12–16)
HEMOGLOBIN: 12.1 G/DL (ref 12–16)
IMMATURE GRANULOCYTES #: 0 K/UL
LACTIC ACID: 3.5 MMOL/L (ref 0.5–1.9)
LYMPHOCYTES ABSOLUTE: 2.6 K/UL (ref 1.1–4.5)
LYMPHOCYTES RELATIVE PERCENT: 36.8 % (ref 20–40)
MCH RBC QN AUTO: 23.5 PG (ref 27–31)
MCHC RBC AUTO-ENTMCNC: 30.1 G/DL (ref 33–37)
MCV RBC AUTO: 78.3 FL (ref 81–99)
MONOCYTES ABSOLUTE: 0.3 K/UL (ref 0–0.9)
MONOCYTES RELATIVE PERCENT: 4.2 % (ref 0–10)
NEUTROPHILS ABSOLUTE: 4.1 K/UL (ref 1.5–7.5)
NEUTROPHILS RELATIVE PERCENT: 58.2 % (ref 50–65)
PDW BLD-RTO: 16.9 % (ref 11.5–14.5)
PERFORMED ON: ABNORMAL
PLATELET # BLD: 249 K/UL (ref 130–400)
PMV BLD AUTO: 9.8 FL (ref 9.4–12.3)
POTASSIUM SERPL-SCNC: 3.4 MMOL/L (ref 3.5–4.9)
PROCALCITONIN: 0.25 NG/ML (ref 0–0.09)
RBC # BLD: 4.97 M/UL (ref 4.2–5.4)
SODIUM BLD-SCNC: 139 MMOL/L (ref 136–145)
WBC # BLD: 7.1 K/UL (ref 4.8–10.8)

## 2022-03-24 PROCEDURE — 3700000000 HC ANESTHESIA ATTENDED CARE: Performed by: UROLOGY

## 2022-03-24 PROCEDURE — 82947 ASSAY GLUCOSE BLOOD QUANT: CPT

## 2022-03-24 PROCEDURE — 2709999900 HC NON-CHARGEABLE SUPPLY: Performed by: UROLOGY

## 2022-03-24 PROCEDURE — 2580000003 HC RX 258: Performed by: UROLOGY

## 2022-03-24 PROCEDURE — 6370000000 HC RX 637 (ALT 250 FOR IP): Performed by: INTERNAL MEDICINE

## 2022-03-24 PROCEDURE — 83036 HEMOGLOBIN GLYCOSYLATED A1C: CPT

## 2022-03-24 PROCEDURE — 83605 ASSAY OF LACTIC ACID: CPT

## 2022-03-24 PROCEDURE — 6360000002 HC RX W HCPCS: Performed by: UROLOGY

## 2022-03-24 PROCEDURE — 3600000014 HC SURGERY LEVEL 4 ADDTL 15MIN: Performed by: UROLOGY

## 2022-03-24 PROCEDURE — 93005 ELECTROCARDIOGRAM TRACING: CPT | Performed by: ANESTHESIOLOGY

## 2022-03-24 PROCEDURE — C2617 STENT, NON-COR, TEM W/O DEL: HCPCS | Performed by: UROLOGY

## 2022-03-24 PROCEDURE — G0378 HOSPITAL OBSERVATION PER HR: HCPCS

## 2022-03-24 PROCEDURE — 7100000000 HC PACU RECOVERY - FIRST 15 MIN: Performed by: UROLOGY

## 2022-03-24 PROCEDURE — 7100000001 HC PACU RECOVERY - ADDTL 15 MIN: Performed by: UROLOGY

## 2022-03-24 PROCEDURE — 84145 PROCALCITONIN (PCT): CPT

## 2022-03-24 PROCEDURE — 3700000001 HC ADD 15 MINUTES (ANESTHESIA): Performed by: UROLOGY

## 2022-03-24 PROCEDURE — 85025 COMPLETE CBC W/AUTO DIFF WBC: CPT

## 2022-03-24 PROCEDURE — 74018 RADEX ABDOMEN 1 VIEW: CPT

## 2022-03-24 PROCEDURE — 80048 BASIC METABOLIC PNL TOTAL CA: CPT

## 2022-03-24 PROCEDURE — 6360000002 HC RX W HCPCS: Performed by: NURSE ANESTHETIST, CERTIFIED REGISTERED

## 2022-03-24 PROCEDURE — 6370000000 HC RX 637 (ALT 250 FOR IP): Performed by: ANESTHESIOLOGY

## 2022-03-24 PROCEDURE — 50590 FRAGMENTING OF KIDNEY STONE: CPT | Performed by: UROLOGY

## 2022-03-24 PROCEDURE — 2700000000 HC OXYGEN THERAPY PER DAY

## 2022-03-24 PROCEDURE — 6370000000 HC RX 637 (ALT 250 FOR IP): Performed by: UROLOGY

## 2022-03-24 PROCEDURE — 2500000003 HC RX 250 WO HCPCS: Performed by: NURSE ANESTHETIST, CERTIFIED REGISTERED

## 2022-03-24 PROCEDURE — 6360000002 HC RX W HCPCS: Performed by: ANESTHESIOLOGY

## 2022-03-24 PROCEDURE — 85018 HEMOGLOBIN: CPT

## 2022-03-24 PROCEDURE — 36415 COLL VENOUS BLD VENIPUNCTURE: CPT

## 2022-03-24 PROCEDURE — 2580000003 HC RX 258: Performed by: ANESTHESIOLOGY

## 2022-03-24 PROCEDURE — 85014 HEMATOCRIT: CPT

## 2022-03-24 PROCEDURE — 3600000004 HC SURGERY LEVEL 4 BASE: Performed by: UROLOGY

## 2022-03-24 PROCEDURE — 52332 CYSTOSCOPY AND TREATMENT: CPT | Performed by: UROLOGY

## 2022-03-24 PROCEDURE — 71045 X-RAY EXAM CHEST 1 VIEW: CPT

## 2022-03-24 DEVICE — URETERAL STENT
Type: IMPLANTABLE DEVICE | Status: FUNCTIONAL
Brand: POLARIS™ ULTRA

## 2022-03-24 RX ORDER — LIDOCAINE HYDROCHLORIDE 10 MG/ML
1 INJECTION, SOLUTION EPIDURAL; INFILTRATION; INTRACAUDAL; PERINEURAL
Status: DISCONTINUED | OUTPATIENT
Start: 2022-03-24 | End: 2022-03-24 | Stop reason: HOSPADM

## 2022-03-24 RX ORDER — ONDANSETRON 4 MG/1
4 TABLET, ORALLY DISINTEGRATING ORAL EVERY 8 HOURS PRN
Status: DISCONTINUED | OUTPATIENT
Start: 2022-03-24 | End: 2022-03-25 | Stop reason: HOSPADM

## 2022-03-24 RX ORDER — SCOLOPAMINE TRANSDERMAL SYSTEM 1 MG/1
1 PATCH, EXTENDED RELEASE TRANSDERMAL
Status: DISCONTINUED | OUTPATIENT
Start: 2022-03-24 | End: 2022-03-24

## 2022-03-24 RX ORDER — LIDOCAINE HYDROCHLORIDE 10 MG/ML
INJECTION, SOLUTION EPIDURAL; INFILTRATION; INTRACAUDAL; PERINEURAL PRN
Status: DISCONTINUED | OUTPATIENT
Start: 2022-03-24 | End: 2022-03-24 | Stop reason: SDUPTHER

## 2022-03-24 RX ORDER — MEPERIDINE HYDROCHLORIDE 25 MG/ML
12.5 INJECTION INTRAMUSCULAR; INTRAVENOUS; SUBCUTANEOUS EVERY 5 MIN PRN
Status: DISCONTINUED | OUTPATIENT
Start: 2022-03-24 | End: 2022-03-24 | Stop reason: HOSPADM

## 2022-03-24 RX ORDER — OXYCODONE HYDROCHLORIDE AND ACETAMINOPHEN 5; 325 MG/1; MG/1
1 TABLET ORAL EVERY 4 HOURS PRN
Status: DISCONTINUED | OUTPATIENT
Start: 2022-03-24 | End: 2022-03-25 | Stop reason: HOSPADM

## 2022-03-24 RX ORDER — LISINOPRIL 20 MG/1
20 TABLET ORAL DAILY
Status: DISCONTINUED | OUTPATIENT
Start: 2022-03-24 | End: 2022-03-25 | Stop reason: HOSPADM

## 2022-03-24 RX ORDER — LEVOFLOXACIN 5 MG/ML
500 INJECTION, SOLUTION INTRAVENOUS ONCE
Status: COMPLETED | OUTPATIENT
Start: 2022-03-24 | End: 2022-03-24

## 2022-03-24 RX ORDER — SODIUM CHLORIDE, SODIUM LACTATE, POTASSIUM CHLORIDE, CALCIUM CHLORIDE 600; 310; 30; 20 MG/100ML; MG/100ML; MG/100ML; MG/100ML
INJECTION, SOLUTION INTRAVENOUS CONTINUOUS
Status: DISCONTINUED | OUTPATIENT
Start: 2022-03-24 | End: 2022-03-24

## 2022-03-24 RX ORDER — MORPHINE SULFATE 4 MG/ML
4 INJECTION, SOLUTION INTRAMUSCULAR; INTRAVENOUS EVERY 5 MIN PRN
Status: DISCONTINUED | OUTPATIENT
Start: 2022-03-24 | End: 2022-03-24 | Stop reason: HOSPADM

## 2022-03-24 RX ORDER — ONDANSETRON 2 MG/ML
4 INJECTION INTRAMUSCULAR; INTRAVENOUS EVERY 6 HOURS PRN
Status: DISCONTINUED | OUTPATIENT
Start: 2022-03-24 | End: 2022-03-25 | Stop reason: HOSPADM

## 2022-03-24 RX ORDER — MORPHINE SULFATE 2 MG/ML
2 INJECTION, SOLUTION INTRAMUSCULAR; INTRAVENOUS EVERY 5 MIN PRN
Status: DISCONTINUED | OUTPATIENT
Start: 2022-03-24 | End: 2022-03-24 | Stop reason: HOSPADM

## 2022-03-24 RX ORDER — SODIUM CHLORIDE 0.9 % (FLUSH) 0.9 %
5-40 SYRINGE (ML) INJECTION PRN
Status: DISCONTINUED | OUTPATIENT
Start: 2022-03-24 | End: 2022-03-25 | Stop reason: HOSPADM

## 2022-03-24 RX ORDER — TAMSULOSIN HYDROCHLORIDE 0.4 MG/1
0.4 CAPSULE ORAL DAILY
Qty: 14 CAPSULE | Refills: 1 | Status: SHIPPED | OUTPATIENT
Start: 2022-03-24 | End: 2022-07-12

## 2022-03-24 RX ORDER — SODIUM CHLORIDE 9 MG/ML
25 INJECTION, SOLUTION INTRAVENOUS PRN
Status: DISCONTINUED | OUTPATIENT
Start: 2022-03-24 | End: 2022-03-25 | Stop reason: HOSPADM

## 2022-03-24 RX ORDER — MIDAZOLAM HYDROCHLORIDE 1 MG/ML
2 INJECTION INTRAMUSCULAR; INTRAVENOUS
Status: DISCONTINUED | OUTPATIENT
Start: 2022-03-24 | End: 2022-03-24 | Stop reason: HOSPADM

## 2022-03-24 RX ORDER — ROCURONIUM BROMIDE 10 MG/ML
INJECTION, SOLUTION INTRAVENOUS PRN
Status: DISCONTINUED | OUTPATIENT
Start: 2022-03-24 | End: 2022-03-24 | Stop reason: SDUPTHER

## 2022-03-24 RX ORDER — FAMOTIDINE 20 MG/1
20 TABLET, FILM COATED ORAL ONCE
Status: DISCONTINUED | OUTPATIENT
Start: 2022-03-24 | End: 2022-03-24 | Stop reason: HOSPADM

## 2022-03-24 RX ORDER — ATORVASTATIN CALCIUM 20 MG/1
40 TABLET, FILM COATED ORAL DAILY
Status: DISCONTINUED | OUTPATIENT
Start: 2022-03-24 | End: 2022-03-25 | Stop reason: HOSPADM

## 2022-03-24 RX ORDER — POLYETHYLENE GLYCOL 3350 17 G/17G
17 POWDER, FOR SOLUTION ORAL DAILY PRN
Status: DISCONTINUED | OUTPATIENT
Start: 2022-03-24 | End: 2022-03-25 | Stop reason: HOSPADM

## 2022-03-24 RX ORDER — SODIUM CHLORIDE 0.9 % (FLUSH) 0.9 %
5-40 SYRINGE (ML) INJECTION EVERY 12 HOURS SCHEDULED
Status: DISCONTINUED | OUTPATIENT
Start: 2022-03-24 | End: 2022-03-25 | Stop reason: HOSPADM

## 2022-03-24 RX ORDER — ONDANSETRON 2 MG/ML
INJECTION INTRAMUSCULAR; INTRAVENOUS PRN
Status: DISCONTINUED | OUTPATIENT
Start: 2022-03-24 | End: 2022-03-24 | Stop reason: SDUPTHER

## 2022-03-24 RX ORDER — ONDANSETRON 2 MG/ML
4 INJECTION INTRAMUSCULAR; INTRAVENOUS EVERY 4 HOURS PRN
Status: DISCONTINUED | OUTPATIENT
Start: 2022-03-24 | End: 2022-03-24

## 2022-03-24 RX ORDER — PHENAZOPYRIDINE HYDROCHLORIDE 100 MG/1
100 TABLET, FILM COATED ORAL 3 TIMES DAILY PRN
Qty: 30 TABLET | Refills: 1 | Status: SHIPPED | OUTPATIENT
Start: 2022-03-24

## 2022-03-24 RX ORDER — DEXTROSE MONOHYDRATE 25 G/50ML
12.5 INJECTION, SOLUTION INTRAVENOUS PRN
Status: DISCONTINUED | OUTPATIENT
Start: 2022-03-24 | End: 2022-03-24 | Stop reason: CLARIF

## 2022-03-24 RX ORDER — SODIUM CHLORIDE 9 MG/ML
INJECTION, SOLUTION INTRAVENOUS CONTINUOUS
Status: DISCONTINUED | OUTPATIENT
Start: 2022-03-24 | End: 2022-03-25 | Stop reason: HOSPADM

## 2022-03-24 RX ORDER — FENTANYL CITRATE 50 UG/ML
INJECTION, SOLUTION INTRAMUSCULAR; INTRAVENOUS PRN
Status: DISCONTINUED | OUTPATIENT
Start: 2022-03-24 | End: 2022-03-24 | Stop reason: SDUPTHER

## 2022-03-24 RX ORDER — AMLODIPINE BESYLATE 5 MG/1
5 TABLET ORAL DAILY
Status: DISCONTINUED | OUTPATIENT
Start: 2022-03-24 | End: 2022-03-25 | Stop reason: HOSPADM

## 2022-03-24 RX ORDER — NICOTINE POLACRILEX 4 MG
15 LOZENGE BUCCAL PRN
Status: DISCONTINUED | OUTPATIENT
Start: 2022-03-24 | End: 2022-03-24 | Stop reason: CLARIF

## 2022-03-24 RX ORDER — OXYCODONE HYDROCHLORIDE AND ACETAMINOPHEN 5; 325 MG/1; MG/1
2 TABLET ORAL EVERY 4 HOURS PRN
Status: DISCONTINUED | OUTPATIENT
Start: 2022-03-24 | End: 2022-03-24

## 2022-03-24 RX ORDER — MIDAZOLAM HYDROCHLORIDE 1 MG/ML
INJECTION INTRAMUSCULAR; INTRAVENOUS PRN
Status: DISCONTINUED | OUTPATIENT
Start: 2022-03-24 | End: 2022-03-24 | Stop reason: SDUPTHER

## 2022-03-24 RX ORDER — HYDROCODONE BITARTRATE AND ACETAMINOPHEN 5; 325 MG/1; MG/1
1 TABLET ORAL EVERY 6 HOURS PRN
Qty: 20 TABLET | Refills: 0 | Status: SHIPPED | OUTPATIENT
Start: 2022-03-24 | End: 2022-03-29

## 2022-03-24 RX ORDER — CIPROFLOXACIN 500 MG/1
500 TABLET, FILM COATED ORAL DAILY
Qty: 10 TABLET | Refills: 0 | Status: SHIPPED | OUTPATIENT
Start: 2022-03-24 | End: 2022-04-03

## 2022-03-24 RX ORDER — LEVOFLOXACIN 500 MG/1
500 TABLET, FILM COATED ORAL DAILY
Status: DISCONTINUED | OUTPATIENT
Start: 2022-03-24 | End: 2022-03-24

## 2022-03-24 RX ORDER — INSULIN GLARGINE 100 [IU]/ML
25 INJECTION, SOLUTION SUBCUTANEOUS NIGHTLY
Status: DISCONTINUED | OUTPATIENT
Start: 2022-03-24 | End: 2022-03-25 | Stop reason: HOSPADM

## 2022-03-24 RX ORDER — PROPOFOL 10 MG/ML
INJECTION, EMULSION INTRAVENOUS PRN
Status: DISCONTINUED | OUTPATIENT
Start: 2022-03-24 | End: 2022-03-24 | Stop reason: SDUPTHER

## 2022-03-24 RX ORDER — DEXAMETHASONE SODIUM PHOSPHATE 10 MG/ML
INJECTION, SOLUTION INTRAMUSCULAR; INTRAVENOUS PRN
Status: DISCONTINUED | OUTPATIENT
Start: 2022-03-24 | End: 2022-03-24 | Stop reason: SDUPTHER

## 2022-03-24 RX ORDER — DIPHENHYDRAMINE HYDROCHLORIDE 50 MG/ML
12.5 INJECTION INTRAMUSCULAR; INTRAVENOUS
Status: DISCONTINUED | OUTPATIENT
Start: 2022-03-24 | End: 2022-03-24 | Stop reason: HOSPADM

## 2022-03-24 RX ORDER — CIPROFLOXACIN 2 MG/ML
400 INJECTION, SOLUTION INTRAVENOUS EVERY 12 HOURS
Status: DISCONTINUED | OUTPATIENT
Start: 2022-03-25 | End: 2022-03-25 | Stop reason: HOSPADM

## 2022-03-24 RX ORDER — HYDROMORPHONE HYDROCHLORIDE 1 MG/ML
0.5 INJECTION, SOLUTION INTRAMUSCULAR; INTRAVENOUS; SUBCUTANEOUS
Status: DISCONTINUED | OUTPATIENT
Start: 2022-03-24 | End: 2022-03-25 | Stop reason: HOSPADM

## 2022-03-24 RX ORDER — DEXTROSE MONOHYDRATE 50 MG/ML
100 INJECTION, SOLUTION INTRAVENOUS PRN
Status: DISCONTINUED | OUTPATIENT
Start: 2022-03-24 | End: 2022-03-25 | Stop reason: HOSPADM

## 2022-03-24 RX ORDER — METOCLOPRAMIDE HYDROCHLORIDE 5 MG/ML
10 INJECTION INTRAMUSCULAR; INTRAVENOUS
Status: DISCONTINUED | OUTPATIENT
Start: 2022-03-24 | End: 2022-03-24 | Stop reason: HOSPADM

## 2022-03-24 RX ORDER — TAMSULOSIN HYDROCHLORIDE 0.4 MG/1
0.4 CAPSULE ORAL ONCE
Status: COMPLETED | OUTPATIENT
Start: 2022-03-24 | End: 2022-03-24

## 2022-03-24 RX ORDER — PHENAZOPYRIDINE HYDROCHLORIDE 100 MG/1
100 TABLET, FILM COATED ORAL ONCE
Status: COMPLETED | OUTPATIENT
Start: 2022-03-24 | End: 2022-03-24

## 2022-03-24 RX ADMIN — SODIUM CHLORIDE: 9 INJECTION, SOLUTION INTRAVENOUS at 20:11

## 2022-03-24 RX ADMIN — DEXAMETHASONE SODIUM PHOSPHATE 10 MG: 10 INJECTION, SOLUTION INTRAMUSCULAR; INTRAVENOUS at 13:59

## 2022-03-24 RX ADMIN — TAMSULOSIN HYDROCHLORIDE 0.4 MG: 0.4 CAPSULE ORAL at 16:48

## 2022-03-24 RX ADMIN — INSULIN GLARGINE 25 UNITS: 100 INJECTION, SOLUTION SUBCUTANEOUS at 21:32

## 2022-03-24 RX ADMIN — ROCURONIUM BROMIDE 50 MG: 10 INJECTION, SOLUTION INTRAVENOUS at 13:48

## 2022-03-24 RX ADMIN — SODIUM CHLORIDE, SODIUM LACTATE, POTASSIUM CHLORIDE, AND CALCIUM CHLORIDE: 600; 310; 30; 20 INJECTION, SOLUTION INTRAVENOUS at 11:55

## 2022-03-24 RX ADMIN — SODIUM CHLORIDE, SODIUM LACTATE, POTASSIUM CHLORIDE, AND CALCIUM CHLORIDE: 600; 310; 30; 20 INJECTION, SOLUTION INTRAVENOUS at 15:22

## 2022-03-24 RX ADMIN — OXYCODONE HYDROCHLORIDE AND ACETAMINOPHEN 1 TABLET: 5; 325 TABLET ORAL at 23:27

## 2022-03-24 RX ADMIN — LEVOFLOXACIN 500 MG: 5 INJECTION, SOLUTION INTRAVENOUS at 13:53

## 2022-03-24 RX ADMIN — ENOXAPARIN SODIUM 40 MG: 40 INJECTION SUBCUTANEOUS at 23:21

## 2022-03-24 RX ADMIN — SUGAMMADEX 200 MG: 100 INJECTION, SOLUTION INTRAVENOUS at 15:16

## 2022-03-24 RX ADMIN — PROPOFOL 150 MG: 10 INJECTION, EMULSION INTRAVENOUS at 13:47

## 2022-03-24 RX ADMIN — SODIUM CHLORIDE: 9 INJECTION, SOLUTION INTRAVENOUS at 23:29

## 2022-03-24 RX ADMIN — ATORVASTATIN CALCIUM 40 MG: 20 TABLET, FILM COATED ORAL at 21:31

## 2022-03-24 RX ADMIN — HYDROCORTISONE SODIUM SUCCINATE 100 MG: 100 INJECTION, POWDER, FOR SOLUTION INTRAMUSCULAR; INTRAVENOUS at 16:22

## 2022-03-24 RX ADMIN — FENTANYL CITRATE 100 MCG: 50 INJECTION, SOLUTION INTRAMUSCULAR; INTRAVENOUS at 13:45

## 2022-03-24 RX ADMIN — MIDAZOLAM 2 MG: 1 INJECTION INTRAMUSCULAR; INTRAVENOUS at 13:43

## 2022-03-24 RX ADMIN — INSULIN LISPRO 5 UNITS: 100 INJECTION, SOLUTION INTRAVENOUS; SUBCUTANEOUS at 21:31

## 2022-03-24 RX ADMIN — LIDOCAINE HYDROCHLORIDE 50 MG: 10 INJECTION, SOLUTION EPIDURAL; INFILTRATION; INTRACAUDAL; PERINEURAL at 13:47

## 2022-03-24 RX ADMIN — ONDANSETRON 4 MG: 2 INJECTION INTRAMUSCULAR; INTRAVENOUS at 13:59

## 2022-03-24 RX ADMIN — HYDROMORPHONE HYDROCHLORIDE 0.5 MG: 1 INJECTION, SOLUTION INTRAMUSCULAR; INTRAVENOUS; SUBCUTANEOUS at 20:27

## 2022-03-24 RX ADMIN — PHENAZOPYRIDINE HYDROCHLORIDE 100 MG: 100 TABLET ORAL at 16:48

## 2022-03-24 ASSESSMENT — PAIN SCALES - GENERAL
PAINLEVEL_OUTOF10: 4
PAINLEVEL_OUTOF10: 3
PAINLEVEL_OUTOF10: 10
PAINLEVEL_OUTOF10: 1
PAINLEVEL_OUTOF10: 5

## 2022-03-24 ASSESSMENT — ENCOUNTER SYMPTOMS: SHORTNESS OF BREATH: 0

## 2022-03-24 ASSESSMENT — PAIN - FUNCTIONAL ASSESSMENT: PAIN_FUNCTIONAL_ASSESSMENT: 0-10

## 2022-03-24 ASSESSMENT — LIFESTYLE VARIABLES: SMOKING_STATUS: 0

## 2022-03-24 ASSESSMENT — PAIN DESCRIPTION - LOCATION: LOCATION: ABDOMEN

## 2022-03-24 ASSESSMENT — PAIN DESCRIPTION - DESCRIPTORS: DESCRIPTORS: ACHING

## 2022-03-24 ASSESSMENT — PAIN DESCRIPTION - PAIN TYPE: TYPE: SURGICAL PAIN

## 2022-03-24 ASSESSMENT — PAIN DESCRIPTION - ORIENTATION: ORIENTATION: RIGHT

## 2022-03-24 NOTE — INTERVAL H&P NOTE
Update History & Physical    The patient's History and Physical of March 7, 2022 was reviewed with the patient and I examined the patient. There was no change. The surgical site was confirmed by the patient and me. Plan: The risks, benefits, expected outcome, and alternative to the recommended procedure have been discussed with the patient. Patient understands and wants to proceed with the procedure.      Electronically signed by Saba Kothari MD on 3/24/2022 at 1:28 PM

## 2022-03-24 NOTE — ANESTHESIA POSTPROCEDURE EVALUATION
Department of Anesthesiology  Postprocedure Note    Patient: Katy Briones  MRN: [id number]  YOB: 1960  Date of evaluation: 3/24/2022  Time:  5:07 PM     Procedure Summary     Date: 03/24/22 Room / Location: [address]    Anesthesia Start: 1343 Anesthesia Stop: 5689    Procedures:       RIGHT RENAL EXTRACORPOREAL SHOCK WAVE LITHOTRIPSY (Right )      CYSTOSCOPY URETERAL STENT INSERTION (Right ) Diagnosis:       Calculus of kidney      (N20.0 - RIGHT)    Surgeons: Amalia Callahan MD Responsible Provider: NADIA Hayes CRNA    Anesthesia Type: general ASA Status: 3          Anesthesia Type: general    Huber Phase I: Huber Score: 10    Huber Phase II:      Last vitals: Reviewed and per EMR flowsheets. Anesthesia Post Evaluation    Patient location during evaluation: PACU  Patient participation: complete - patient participated  Level of consciousness: awake and alert  Pain score: 4  Airway patency: patent  Nausea & Vomiting: no nausea and no vomiting  Cardiovascular status: blood pressure returned to baseline  Respiratory status: acceptable  Hydration status: euvolemic  Comments: Unexplained hypotension upon arival, intraop course normal. ekg no significant changes. Wonder if reaction to muscle relaxant reversal. BP normalized, tx with hydrocortisone.  Consulted Hospitalist and likely overnight stay

## 2022-03-24 NOTE — PROGRESS NOTES
15:27    Pt to PACU. Blood pressure extremely low. (See flowsheets). Radial pulse very weak, skin tacky and cool. Pt. Positioned trendelenburg and IV fluids on pressure bag. Second IV was established. Surgical staff remained at bedside to help manage pt. They called Dr. Sirena Sparrow to bedside. Pt's BP slow to respond to fluids, but did slowly rise to above 716 systolic. 15:45 (approx)    Dr. Carlyle Bailey to bedside. Labs were ordered. Decision to admit. Dr. Snehal Zavala was contacted and felt this should be an ICU admit. Dr. Ingrid Owen was called and a voice mail was left. 16:15    Dr. Carlyle Bailey at bedside again. Pt's vitals, especially BP appear to be stabilizing above 572 systolic. Dr. Carlyle Bailey would like to watch patient a little longer and then decide on admission placement. Pt. Is alert and oriented, complaining only of urinary discomfort post-procedure. 16:50    Dr. Carlyle Bailey back at bedside. He feels pt's BP is stable enough for pt. To go to floor. He will call Dr. Snehal Zavala to admit. 17:15    Dr. Carlyle Bailey in PACU. .. Has spoken to Dr. Snehal Zavala. Since pt appears stable now, Dr. Carlyle Bailey will admit and consult Dr. Snehal Zavala.

## 2022-03-24 NOTE — OP NOTE
Brief operative Note      Patient: Solomon Juárez  YOB: 1960  MRN: [id number]    Date of Procedure: 3/24/2022    Pre-Op Diagnosis: N20.0 - RIGHT renal calculus  Post-Op Diagnosis: Same       Procedure(s):  RIGHT RENAL EXTRACORPOREAL SHOCK WAVE LITHOTRIPSY  CYSTOSCOPY RIGHT URETERAL STENT INSERTION 5 x 26    Surgeon(s):  Jesica Rivers MD    Assistant:   * No surgical staff found *    Anesthesia: General    Estimated Blood Loss (mL): 0    Complications: None    Specimens:   * No specimens in log *    Implants:  Implant Name Type Inv. Item Serial No.  Lot No. LRB No. Used Action   STENT URET 5FR L26CM PERCFLX HYDR+ DBL PGTL THRD 2 - [id number]  STENT URET 5FR L26CM PERCFLX HYDR+ DBL PGTL THRD 2  [employer]- [id number] Right 1 Implanted         Drains: * No LDAs found *    Findings: Stone seen lower pole. 5 x 26 right stent placed with string attached. 3000 shockwaves power level 7. The stone appeared less dense and spreadout consistent with fragmentation. Cystoscopy revealed normal-appearing bladder no lesions    Detailed Description of Procedure:   See dictated report:  [id number]    Disposition to PACU then to op care outpatient follow-up in 2 weeks with KUB. If stone fragmented well can remove stent.     Electronically signed by Shala Stewart MD on 3/24/2022 at 3:04 PM

## 2022-03-24 NOTE — ANESTHESIA POSTPROCEDURE EVALUATION
Department of Anesthesiology  Postprocedure Note    Patient: Karen Villatoro  MRN: [id number]  YOB: 1960  Date of evaluation: 3/24/2022  Time:  3:50 PM     Procedure Summary     Date: 03/24/22 Room / Location: [address]    Anesthesia Start: 1343 Anesthesia Stop: 1592    Procedures:       RIGHT RENAL EXTRACORPOREAL SHOCK WAVE LITHOTRIPSY (Right )      CYSTOSCOPY URETERAL STENT INSERTION (Right ) Diagnosis:       Calculus of kidney      (N20.0 - RIGHT)    Surgeons: Katherine Dunne MD Responsible Provider: NADIA Chan CRNA    Anesthesia Type: general ASA Status: 3          Anesthesia Type: general    Huber Phase I: Huber Score: 10    Huber Phase II:      Last vitals: Reviewed and per EMR flowsheets. Anesthesia Post Evaluation    Patient location during evaluation: PACU  Patient participation: complete - patient participated  Level of consciousness: sleepy but conscious  Pain score: 0  Airway patency: patent  Nausea & Vomiting: no nausea and no vomiting  Complications: no  Cardiovascular status: hemodynamically stable  Respiratory status: acceptable  Hydration status: stable  Comments: Pt hypotensive on arrival to PACU. Treated with phenylephrine, ephedrine, and fluid bolus. BP returned to baseline prior to handoff to pacu. Dr. Zhang No notified of BP changes.

## 2022-03-24 NOTE — CONSULTS
HYDROcodone-acetaminophen (NORCO) 5-325 MG per tablet Take 1 tablet by mouth every 6 hours as needed for Pain for up to 5 days. 3/24/22 3/29/22 Yes Suman Veliz MD   tamsulosin [facility]) 0.4 MG capsule Take 1 capsule by mouth daily for 14 days 3/24/22 4/7/22 Yes Suman Veliz MD   ciprofloxacin (CIPRO) 500 MG tablet Take 1 tablet by mouth daily for 10 days 3/24/22 4/3/22 Yes Suman Veliz MD   LYUMJEV KWIKPEN 100 UNIT/ML SOPN Inject 15 Units into the skin 3 times daily (with meals)  12/24/21   Historical Provider, MD   atorvastatin (LIPITOR) 40 MG tablet Take 40 mg by mouth daily     Historical Provider, MD   lisinopril (PRINIVIL;ZESTRIL) 20 MG tablet Take 20 mg by mouth daily    Historical Provider, MD   amLODIPine (NORVASC) 5 MG tablet Take 5 mg by mouth daily    Historical Provider, MD   aspirin 325 MG tablet Take 325 mg by mouth daily    Historical Provider, MD   Insulin Degludec (TRESIBA) 100 UNIT/ML SOLN Inject 40 Units into the skin once Nightly    Historical Provider, MD       Allergies:    Patient has no known allergies. Social History:  The patient currently lives home  Tobacco:   reports that she quit smoking about 6 years ago. Her smoking use included cigarettes. She smoked 0.50 packs per day. She has never used smokeless tobacco.  Alcohol:   reports no history of alcohol use. Illicit Drugs: denies    Family History:      Problem Relation Age of Onset    Cancer Mother     High Blood Pressure Mother     Kidney Disease Father     High Blood Pressure Father     Diabetes Father        Review of Systems:   Review of Systems   Neurological: Positive for dizziness and weakness. Physical Examination:  Vitals: BP (!) 149/77   Pulse 104   Temp 96.4 °F (35.8 °C) (Temporal)   Resp 16   Ht 5' 1\" (1.549 m)   Wt 165 lb (74.8 kg)   SpO2 96%   BMI 31.18 kg/m²     Physical Exam  Vitals and nursing note reviewed. Constitutional:       General: She is not in acute distress. BUN 16 13   CREATININE 0.9 0.9   CALCIUM 9.7 7.8*   Coag Panel:   Recent Labs     03/22/22  1310   INR 0.97   PROTIME 13.1   APTT 27.8       Urinalysis:  Lab Results   Component Value Date    NITRU Negative 04/29/2019    WBCUA 16-20 04/29/2019    BACTERIA trace 04/29/2019    RBCUA 6-10 04/29/2019    BLOODU NEG 03/03/2022    BLOODU MODERATE 04/29/2019    SPECGRAV 1.020 03/03/2022    SPECGRAV 1.015 04/29/2019    GLUCOSEU 1,000 03/03/2022    GLUCOSEU Negative 04/29/2019       Active Hospital Problems    Diagnosis Date Noted    Renal calculus, right [N20.0] 03/24/2022    Postoperative hypotension [I95.89] 03/24/2022       Assessment and Plan:    Postoperative hypotension   -CXR   -IVF   -Lactic acid, Procal    -ECHO    -EKG               - Neuro checks              - Orthostatic blood pressure and pulse daily               - Fall precaution              - Bed alarm on   -Strict I & O               - Monitor on telemetry   -Hold home blood pressure medications: Lisinopril & Norvasc    -Daily labs     Renal calculus, right   -Per Urology    -IV antibiotic, Cipro    -As needed pain medication    -As needed anti-emetic    -Strain all urine   Diabetes mellitus  -Accucheck  -A1c  -Sliding scale insulin   -Lantus 25 units nightly  -Hypoglycemia protocol as warranted       DVT Prophylaxis: ISABELLA Carey, APRN - CNP  2/49/9663,7:96 PM       EMR Dragon/Transcription disclaimer:   Much of this encounter note is an electronic transcription/translation of spoken language to printed text.  The electronic translation of spoken language may permit erroneous, or at times, nonsensical words or phrases to be inadvertently transcribed; although attempts have made to review the note for such errors, some may still exist.

## 2022-03-24 NOTE — ANESTHESIA PRE PROCEDURE
Department of Anesthesiology  Preprocedure Note       Name:  Bandar Cat   Age:  57 y.o.  :  1960                                          MRN:  [id number]         Date:  3/24/2022      Surgeon: Brandie Rome):  Daniel Murphy MD    Procedure: Procedure(s):  RIGHT RENAL EXTRACORPOREAL SHOCK WAVE LITHOTRIPSY    Medications prior to admission:   Prior to Admission medications    Medication Sig Start Date End Date Taking? Authorizing Provider   LYUMKAREL KWIKPEN 100 UNIT/ML SOPN Inject 15 Units into the skin 3 times daily (with meals)  21   Historical Provider, MD   atorvastatin (LIPITOR) 40 MG tablet Take 40 mg by mouth daily     Historical Provider, MD   lisinopril (PRINIVIL;ZESTRIL) 20 MG tablet Take 20 mg by mouth daily    Historical Provider, MD   amLODIPine (NORVASC) 5 MG tablet Take 5 mg by mouth daily    Historical Provider, MD   aspirin 325 MG tablet Take 325 mg by mouth daily    Historical Provider, MD   Insulin Degludec (TRESIBA) 100 UNIT/ML SOLN Inject 40 Units into the skin once Nightly    Historical Provider, MD       Current medications:    Current Facility-Administered Medications   Medication Dose Route Frequency Provider Last Rate Last Admin    levoFLOXacin (LEVAQUIN) tablet 500 mg  500 mg Oral Daily Mayr Vyas APRN - CNP        lactated ringers infusion   IntraVENous Continuous Noelle Simmons DO           Allergies:  No Known Allergies    Problem List:  There is no problem list on file for this patient.       Past Medical History:        Diagnosis Date    Diabetes mellitus ([facility] [address]     Hyperlipidemia     Hypertension        Past Surgical History:        Procedure Laterality Date    APPENDECTOMY      CHOLECYSTECTOMY      HYSTERECTOMY         Social History:    Social History     Tobacco Use    Smoking status: Former Smoker     Packs/day: 0.50     Types: Cigarettes     Quit date: 3/22/2016     Years since quittin.0    Smokeless tobacco: Never Used   Substance Use Topics    Alcohol use: Never                                Counseling given: Not Answered      Vital Signs (Current):   Vitals:    03/24/22 1145   BP: (!) 133/92   Pulse: 80   Resp: 16   Temp: 97 °F (36.1 °C)   TempSrc: Temporal   SpO2: 100%   Weight: 165 lb (74.8 kg)   Height: 5' 1\" (1.549 m)                                              BP Readings from Last 3 Encounters:   03/24/22 (!) 133/92   03/07/22 134/80   03/03/22 128/79       NPO Status:                                                                                 BMI:   Wt Readings from Last 3 Encounters:   03/24/22 165 lb (74.8 kg)   03/22/22 165 lb (74.8 kg)   03/07/22 168 lb (76.2 kg)     Body mass index is 31.18 kg/m². CBC:   Lab Results   Component Value Date    WBC 4.3 03/22/2022    RBC 5.32 03/22/2022    HGB 12.5 03/22/2022    HCT 42.1 03/22/2022    MCV 79.1 03/22/2022    RDW 17.5 03/22/2022     03/22/2022       CMP:   Lab Results   Component Value Date     03/22/2022    K 4.2 03/22/2022     03/22/2022    CO2 24 03/22/2022    BUN 16 03/22/2022    CREATININE 0.9 03/22/2022    GFRAA >59 03/22/2022    LABGLOM >60 03/22/2022    GLUCOSE 160 03/22/2022    PROT 7.9 04/29/2019    CALCIUM 9.7 03/22/2022    BILITOT <0.2 04/29/2019    ALKPHOS 110 04/29/2019    AST 19 04/29/2019    ALT 14 04/29/2019       POC Tests: No results for input(s): POCGLU, POCNA, POCK, POCCL, POCBUN, POCHEMO, POCHCT in the last 72 hours.     Coags:   Lab Results   Component Value Date    PROTIME 13.1 03/22/2022    INR 0.97 03/22/2022    APTT 27.8 03/22/2022       HCG (If Applicable): No results found for: PREGTESTUR, PREGSERUM, HCG, HCGQUANT     ABGs: No results found for: PHART, PO2ART, WHL4SZZ, HDG9CWO, BEART, F1MUQGKN     Type & Screen (If Applicable):  No results found for: LABABO, LABRH    Drug/Infectious Status (If Applicable):  No results found for: HIV, HEPCAB    COVID-19 Screening (If Applicable):   Lab Results   Component Value Date    COVID19 Not Detected 03/22/2022           Anesthesia Evaluation  Patient summary reviewed and Nursing notes reviewed no history of anesthetic complications:   Airway: Mallampati: II  TM distance: >3 FB   Neck ROM: full  Mouth opening: > = 3 FB Dental: normal exam         Pulmonary:Negative Pulmonary ROS and normal exam  breath sounds clear to auscultation      (-) shortness of breath and not a current smoker          Patient did not smoke on day of surgery. Cardiovascular:    (+) hypertension:,     (-) CAD,  angina and  CHF    NYHA Classification: I  ECG reviewed  Rhythm: regular  Rate: normal           Beta Blocker:  Not on Beta Blocker         Neuro/Psych:   Negative Neuro/Psych ROS     (-) seizures, CVA and depression/anxiety            GI/Hepatic/Renal: Neg GI/Hepatic/Renal ROS  (+) renal disease: kidney stones, morbid obesity     (-) hiatal hernia and GERD       Endo/Other: Negative Endo/Other ROS   (+) DiabetesType II DM, , .          Pt had PAT visit. Abdominal:       Abdomen: soft. Vascular: Other Findings:             Anesthesia Plan      general     ASA 3     (Iv zofran within 30 min of closing )  Induction: intravenous. BIS  MIPS: Postoperative opioids intended and Prophylactic antiemetics administered. Anesthetic plan and risks discussed with patient. Use of blood products discussed with patient whom. Plan discussed with CRNA.     Attending anesthesiologist reviewed and agrees with Pre Eval content              Luci Landeros MD   3/24/2022

## 2022-03-25 VITALS
HEIGHT: 61 IN | WEIGHT: 165 LBS | TEMPERATURE: 97.7 F | HEART RATE: 73 BPM | DIASTOLIC BLOOD PRESSURE: 60 MMHG | RESPIRATION RATE: 16 BRPM | OXYGEN SATURATION: 95 % | BODY MASS INDEX: 31.15 KG/M2 | SYSTOLIC BLOOD PRESSURE: 124 MMHG

## 2022-03-25 LAB
ANION GAP SERPL CALCULATED.3IONS-SCNC: 13 MMOL/L (ref 7–19)
BACTERIA: NEGATIVE /HPF
BASOPHILS ABSOLUTE: 0 K/UL (ref 0–0.2)
BASOPHILS RELATIVE PERCENT: 0 % (ref 0–1)
BILIRUBIN URINE: NEGATIVE
BLOOD, URINE: ABNORMAL
BUN BLDV-MCNC: 16 MG/DL (ref 8–23)
CALCIUM SERPL-MCNC: 8 MG/DL (ref 8.8–10.2)
CHLORIDE BLD-SCNC: 103 MMOL/L (ref 98–111)
CLARITY: CLEAR
CO2: 18 MMOL/L (ref 22–29)
COLOR: ABNORMAL
CREAT SERPL-MCNC: 1 MG/DL (ref 0.5–0.9)
CRYSTALS, UA: ABNORMAL /HPF
EKG P AXIS: 66 DEGREES
EKG P AXIS: 71 DEGREES
EKG P-R INTERVAL: 106 MS
EKG P-R INTERVAL: 136 MS
EKG Q-T INTERVAL: 372 MS
EKG Q-T INTERVAL: 394 MS
EKG QRS DURATION: 80 MS
EKG QRS DURATION: 90 MS
EKG QTC CALCULATION (BAZETT): 413 MS
EKG QTC CALCULATION (BAZETT): 459 MS
EKG T AXIS: -11 DEGREES
EKG T AXIS: -2 DEGREES
EOSINOPHILS ABSOLUTE: 0 K/UL (ref 0–0.6)
EOSINOPHILS RELATIVE PERCENT: 0 % (ref 0–5)
EPITHELIAL CELLS, UA: 0 /HPF (ref 0–5)
GFR AFRICAN AMERICAN: >59
GFR NON-AFRICAN AMERICAN: 56
GLUCOSE BLD-MCNC: 207 MG/DL (ref 70–99)
GLUCOSE BLD-MCNC: 281 MG/DL (ref 70–99)
GLUCOSE BLD-MCNC: 289 MG/DL (ref 70–99)
GLUCOSE BLD-MCNC: 304 MG/DL (ref 74–109)
GLUCOSE URINE: 500 MG/DL
HCT VFR BLD CALC: 37.6 % (ref 37–47)
HEMOGLOBIN: 11.1 G/DL (ref 12–16)
HYALINE CASTS: 2 /HPF (ref 0–8)
IMMATURE GRANULOCYTES #: 0 K/UL
KETONES, URINE: NEGATIVE MG/DL
LACTIC ACID: 1.7 MMOL/L (ref 0.5–1.9)
LEUKOCYTE ESTERASE, URINE: ABNORMAL
LV EF: 63 %
LVEF MODALITY: NORMAL
LYMPHOCYTES ABSOLUTE: 0.4 K/UL (ref 1.1–4.5)
LYMPHOCYTES RELATIVE PERCENT: 12.9 % (ref 20–40)
MCH RBC QN AUTO: 23.2 PG (ref 27–31)
MCHC RBC AUTO-ENTMCNC: 29.5 G/DL (ref 33–37)
MCV RBC AUTO: 78.5 FL (ref 81–99)
MONOCYTES ABSOLUTE: 0.2 K/UL (ref 0–0.9)
MONOCYTES RELATIVE PERCENT: 5.7 % (ref 0–10)
NEUTROPHILS ABSOLUTE: 2.7 K/UL (ref 1.5–7.5)
NEUTROPHILS RELATIVE PERCENT: 81.1 % (ref 50–65)
NITRITE, URINE: POSITIVE
PDW BLD-RTO: 16.6 % (ref 11.5–14.5)
PERFORMED ON: ABNORMAL
PH UA: 6 (ref 5–8)
PLATELET # BLD: 216 K/UL (ref 130–400)
PMV BLD AUTO: 10.3 FL (ref 9.4–12.3)
POTASSIUM REFLEX MAGNESIUM: 4.6 MMOL/L (ref 3.5–5)
PROTEIN UA: 30 MG/DL
RBC # BLD: 4.79 M/UL (ref 4.2–5.4)
RBC UA: >900 /HPF (ref 0–4)
SODIUM BLD-SCNC: 134 MMOL/L (ref 136–145)
SPECIFIC GRAVITY UA: 1.01 (ref 1–1.03)
UROBILINOGEN, URINE: 0.2 E.U./DL
WBC # BLD: 3.3 K/UL (ref 4.8–10.8)
WBC UA: 6 /HPF (ref 0–5)

## 2022-03-25 PROCEDURE — 87086 URINE CULTURE/COLONY COUNT: CPT

## 2022-03-25 PROCEDURE — 36415 COLL VENOUS BLD VENIPUNCTURE: CPT

## 2022-03-25 PROCEDURE — 83605 ASSAY OF LACTIC ACID: CPT

## 2022-03-25 PROCEDURE — 87040 BLOOD CULTURE FOR BACTERIA: CPT

## 2022-03-25 PROCEDURE — 99024 POSTOP FOLLOW-UP VISIT: CPT | Performed by: UROLOGY

## 2022-03-25 PROCEDURE — 81001 URINALYSIS AUTO W/SCOPE: CPT

## 2022-03-25 PROCEDURE — 93306 TTE W/DOPPLER COMPLETE: CPT

## 2022-03-25 PROCEDURE — 80048 BASIC METABOLIC PNL TOTAL CA: CPT

## 2022-03-25 PROCEDURE — 85025 COMPLETE CBC W/AUTO DIFF WBC: CPT

## 2022-03-25 PROCEDURE — 6370000000 HC RX 637 (ALT 250 FOR IP): Performed by: INTERNAL MEDICINE

## 2022-03-25 PROCEDURE — G0378 HOSPITAL OBSERVATION PER HR: HCPCS

## 2022-03-25 PROCEDURE — 6360000002 HC RX W HCPCS: Performed by: UROLOGY

## 2022-03-25 PROCEDURE — 82947 ASSAY GLUCOSE BLOOD QUANT: CPT

## 2022-03-25 PROCEDURE — 93005 ELECTROCARDIOGRAM TRACING: CPT

## 2022-03-25 RX ADMIN — CIPROFLOXACIN 400 MG: 2 INJECTION, SOLUTION INTRAVENOUS at 05:15

## 2022-03-25 RX ADMIN — INSULIN LISPRO 6 UNITS: 100 INJECTION, SOLUTION INTRAVENOUS; SUBCUTANEOUS at 09:00

## 2022-03-25 NOTE — PROGRESS NOTES
[facility] Progress Note    Patient:  Sondra Blake  YOB: 1960  Date of Service: 3/25/2022  MRN: [id number]   Acct: [de-identified]   Primary Care Physician: Amelia Vaughn MD  Advance Directive: Full Code  Admit Date: 3/24/2022       Hospital Day: 0        CHIEF COMPLAINT: Postop hypotension      3/25/2022 1:36 PM  Subjective / Interval History:   03/25/2022  Patient seen and examined. Doing well. No new complaints. Blood pressure stable. Denies any other acute complaints or distress at this time. No fever, chills, dizziness, lightheadedness or palpitations. Review of Systems:   Review of Systems  ROS: 14 point review of systems is negative except as specifically addressed above. ADULT DIET;  Regular    Intake/Output Summary (Last 24 hours) at 3/25/2022 1336  Last data filed at 3/25/2022 0631  Gross per 24 hour   Intake 2329.24 ml   Output 301 ml   Net 2028.24 ml       Medications:   sodium chloride Stopped (03/25/22 1249)    sodium chloride      dextrose       Current Facility-Administered Medications   Medication Dose Route Frequency Provider Last Rate Last Admin    0.9 % sodium chloride infusion   IntraVENous Continuous Sunshine Moss MD   Stopped at 03/25/22 1249    HYDROmorphone HCl PF (DILAUDID) injection 0.5 mg  0.5 mg IntraVENous Q3H PRN Sunshine Moss MD   0.5 mg at 03/24/22 2027    oxyCODONE-acetaminophen (PERCOCET) 5-325 MG per tablet 1 tablet  1 tablet Oral Q4H PRN Sunshine Moss MD   1 tablet at 03/24/22 2327    atorvastatin (LIPITOR) tablet 40 mg  40 mg Oral Daily Sunshine Moss MD   40 mg at 03/24/22 2131    [Held by provider] amLODIPine (NORVASC) tablet 5 mg  5 mg Oral Daily Sunshine Moss MD        [Held by provider] lisinopril (PRINIVIL;ZESTRIL) tablet 20 mg  20 mg Oral Daily Sunshine Moss MD        sodium chloride flush 0.9 % injection 5-40 mL  5-40 mL IntraVENous 2 times per day Sunshine Moss MD        sodium chloride flush 0.9 % injection 5-40 mL  5-40 mL IntraVENous PRN Macey Harris MD        0.9 % sodium chloride infusion  25 mL IntraVENous PRN Macey Harris MD        polyethylene glycol [facility]) packet 17 g  17 g Oral Daily PRN Macey Harris MD        ondansetron (ZOFRAN-ODT) disintegrating tablet 4 mg  4 mg Oral Q8H PRN Macey Harris MD        Or    [facility] injection 4 mg  4 mg IntraVENous Q6H PRN Macey Harris MD        enoxaparin (LOVENOX) injection 40 mg  40 mg SubCUTAneous Daily Macey Harris MD   40 mg at 03/24/22 2321    ciprofloxacin (CIPRO) IVPB 400 mg  400 mg IntraVENous Q12H Macey Harris MD   Stopped at 03/25/22 0631    glucagon (rDNA) injection 1 mg  1 mg IntraMUSCular PRN Kateryna Danielle MD        dextrose 5 % solution  100 mL/hr IntraVENous PRN Kateryna Danielle MD        insulin glargine (LANTUS) injection vial 25 Units  25 Units SubCUTAneous Nightly Kateryna Danielle MD   25 Units at 03/24/22 2132    insulin lispro (HUMALOG) injection vial 7 Units  7 Units SubCUTAneous TID LAURA Danielle MD   7 Units at 03/25/22 1017    insulin lispro (HUMALOG) injection vial 0-12 Units  0-12 Units SubCUTAneous TID LAURA Danielle MD   6 Units at 03/25/22 0900    insulin lispro (HUMALOG) injection vial 0-6 Units  0-6 Units SubCUTAneous Nightly Kateryna Danielle MD   5 Units at 03/24/22 2131    glucose chewable tablet 4 each  4 tablet Oral PRN Kateryna Danielle MD        dextrose bolus (hypoglycemia) 10% 125 mL  125 mL IntraVENous KAITLIN Danielle MD        Or    dextrose bolus (hypoglycemia) 10% 250 mL  250 mL IntraVENous KAITLIN Danielle MD         Current Outpatient Medications   Medication Sig Dispense Refill    phenazopyridine (PYRIDIUM) 100 MG tablet Take 1 tablet by mouth 3 times daily as needed for Pain (for burning and spasms) 30 tablet 1    HYDROcodone-acetaminophen (NORCO) 5-325 MG per tablet Take 1 tablet by mouth every 6 hours as needed for Pain for up to 5 days. 20 tablet 0    tamsulosin (FLOMAX) 0.4 MG capsule Take 1 capsule by mouth daily for 14 days 14 capsule 1    ciprofloxacin (CIPRO) 500 MG tablet Take 1 tablet by mouth daily for 10 days 10 tablet 0    LYUMJEV KWIKPEN 100 UNIT/ML SOPN Inject 15 Units into the skin 3 times daily (with meals)       atorvastatin (LIPITOR) 40 MG tablet Take 40 mg by mouth daily       lisinopril (PRINIVIL;ZESTRIL) 20 MG tablet Take 20 mg by mouth daily      amLODIPine (NORVASC) 5 MG tablet Take 5 mg by mouth daily      aspirin 325 MG tablet Take 325 mg by mouth daily      Insulin Degludec (TRESIBA) 100 UNIT/ML SOLN Inject 40 Units into the skin once Nightly           sodium chloride Stopped (03/25/22 1249)    sodium chloride      dextrose        atorvastatin  40 mg Oral Daily    [Held by provider] amLODIPine  5 mg Oral Daily    [Held by provider] lisinopril  20 mg Oral Daily    sodium chloride flush  5-40 mL IntraVENous 2 times per day    enoxaparin  40 mg SubCUTAneous Daily    ciprofloxacin  400 mg IntraVENous Q12H    insulin glargine  25 Units SubCUTAneous Nightly    insulin lispro  7 Units SubCUTAneous TID WC    insulin lispro  0-12 Units SubCUTAneous TID WC    insulin lispro  0-6 Units SubCUTAneous Nightly     HYDROmorphone, oxyCODONE-acetaminophen, sodium chloride flush, sodium chloride, polyethylene glycol, ondansetron **OR** ondansetron, glucagon (rDNA), dextrose, glucose, dextrose bolus (hypoglycemia) **OR** dextrose bolus (hypoglycemia)  ADULT DIET;  Regular       Labs:   CBC with DIFF:  Recent Labs     03/24/22  1600 03/24/22  2201 03/25/22  0403   WBC 7.1  --  3.3*   RBC 4.97  --  4.79   HGB 11.7* 12.1 11.1*   HCT 38.9 40.6 37.6   MCV 78.3*  --  78.5*   MCH 23.5*  --  23.2*   MCHC 30.1*  --  29.5*   RDW 16.9*  --  16.6*     --  216   MPV 9.8  --  10.3   NEUTOPHILPCT 58.2  --  81.1*   LYMPHOPCT 36.8  --  12.9*   MONOPCT 4.2  --  5.7   EOSRELPCT 0.1  --  0.0   BASOPCT 0.3  --  0.0   NEUTROABS 4.1  --  2.7   LYMPHSABS 2.6  --  0.4*   MONOSABS 0.30  --  0.20   EOSABS 0.00  --  0.00   BASOSABS 0.00  --  0.00       CMP/BMP:  Recent Labs     03/24/22  1600 03/25/22  0403    134*   K 3.4* 4.6    103   CO2 23 18*   ANIONGAP 11 13   GLUCOSE 284* 304*   BUN 13 16   CREATININE 0.9 1.0*   LABGLOM >60 56*   CALCIUM 7.8* 8.0*         CRP:  No results for input(s): CRP in the last 72 hours. Sed Rate:  No results for input(s): SEDRATE in the last 72 hours. HgBA1c:  No components found for: HGBA1C  FLP:  No results found for: TRIG, HDL, LDLCALC, LDLDIRECT, LABVLDL  TSH:  No results found for: TSH  Troponin T: No results for input(s): TROPONINI in the last 72 hours. Pro-BNP: No results for input(s): BNP in the last 72 hours. INR:   No results for input(s): INR in the last 72 hours. ABGs: No results found for: PHART, PO2ART, GFX9MQC  UA:  Recent Labs     03/25/22  1112   COLORU DARK YELLOW*   PHUR 6.0   WBCUA 6*   RBCUA >900*   BACTERIA NEGATIVE*   CLARITYU Clear   SPECGRAV 1.008   LEUKOCYTESUR SMALL*   UROBILINOGEN 0.2   BILIRUBINUR Negative   BLOODU LARGE*   GLUCOSEU 500*         Culture Results:    No results for input(s): CXSURG in the last 720 hours. Blood Culture Recent: No results for input(s): BC in the last 720 hours. No results for input(s): BC, BLOODCULT2, ORG in the last 72 hours. Cultures:   No results for input(s): CULTURE in the last 72 hours. No results for input(s): BC, Darcie Plunkett in the last 72 hours. No results for input(s): CXSURG in the last 72 hours. No results for input(s): MG, PHOS in the last 72 hours. No results for input(s): AST, ALT, ALB, BILITOT, ALKPHOS, ALB in the last 72 hours.       RAD:   ECHO Complete 2D W Doppler W Color    Result Date: 3/25/2022  Transthoracic Echocardiography Report (TTE)  Demographics   Patient Name  Edger Blade Date of Study          03/25/2022   MRN           [id number] Gender                 Female   Date of Birth 1960     Room Number            NNK-1022   Age           57 year(s)   Height:       61 inches      Referring Physician    Haylie Lyles   Weight:       165 pounds     Sonographer            Mendel Nottingjama ASHLIE Lin   BSA:          1.74 m^2       Interpreting Physician Jose Rafael Jay   BMI:          31.18 kg/m^2  Procedure Type of Study   TTE procedure:ECHO NO CONTRAST WITH DOP/COLR. Study Location: Echo Lab Technical Quality: Adequate visualization Patient Status: Inpatient BP: 132/70 mmHg Indications:Hypotension. Conclusions   Summary  Normal left ventricular size with preserved LV function and an estimated  ejection fraction of approximately 60-65%. Normal global longitudinal  strain pattern with average GLS -21.1%. Normal left ventricular wall  thickness. Normal diastolic filling pattern. Normal right ventricular size with preserved RV function (TAPSE 24 mm). Trace tricuspid regurgitation with normal estimated RVSP. No evidence of significant inter-atrial communications by color flow  Doppler only. No evidence of significant pericardial effusion is noted. Aortic root and ascending aorta are within normal limits. The rhythm is sinus. Normal resting BP at time of exam.  Normal SVI 58 ml/m2, CI 4.5 L/min/m2, and  dynes/sec/cm-5. Signature   ----------------------------------------------------------------  Electronically signed by Mala RamirezInterpreting physician)  on 03/25/2022 08:42 AM  ----------------------------------------------------------------   Findings   Mitral Valve  Mildly thickened mitral valve with normal leaflet mobility. No evidence of  mitral valve stenosis or mitral regurgitation. Aortic Valve  Aortic valve is not well visualized. No significant aortic regurgitation or stenosis is noted. Tricuspid Valve  Tricuspid valve is structurally normal.  Trace tricuspid regurgitation with normal estimated RVSP.    Pulmonic Valve  The pulmonic valve was not well visualized. Left Atrium  Normal size left atrium. No evidence of significant inter-atrial communications by color flow  Doppler only. Left Ventricle  Normal left ventricular size with preserved LV function and an estimated  ejection fraction of approximately 60-65%. Normal global longitudinal  strain pattern with average GLS -21.1%. Normal left ventricular wall thickness. Normal diastolic filling pattern. Right Atrium  Normal right atrial dimension with no evidence of thrombus or mass noted. Right Ventricle  Normal right ventricular size with preserved RV function (TAPSE 24 mm). Pericardial Effusion  No evidence of significant pericardial effusion is noted. Pleural Effusion  No evidence of pleural effusion. Miscellaneous  Aortic root and ascending aorta are within normal limits. The arch is sub-optimally visualized. IVC normal.  The rhythm is sinus. Normal resting BP at time of exam.  Normal SVI 58 ml/m2, CI 4.5 L/min/m2, and  dynes/sec/cm-5. M-Mode Measurements (cm)   LVIDd: 4.23 cm                         LVIDs: 2.94 cm  IVSd: 0.82 cm  LVPWd: 0.98 cm                         AO Root Dimension: 2.2 cm  Rt. Vent.  Dimension: 2.68 cm           LA: 2.9 cm  % Ejection Fraction: 65 %              LVOT: 2.2 cm  Doppler Measurements:   AV Peak Velocity:159 cm/s            MV Peak E-Wave: 83.1 cm/s  AV Peak Gradient: 10.11 mmHg         MV Peak A-Wave: 85.7 cm/s  AV Mean Gradient: 6 mmHg             MV E/A Ratio: 0.97 %  AV Area (Continuity):2.87 cm^2       MV Peak Gradient: 2.76 mmHg  TR Velocity:249 cm/s                 MV P1/2t: 56 msec  TR Gradient:24.8 mmHg                MVA by PHT3.93 cm^2  Estimated RAP:3 mmHg  RVSP:28 mmHg      CT ABDOMEN PELVIS W WO CONTRAST Additional Contrast? Radiologist Recommendation (Urogram Protocol)    Result Date: 3/7/2022  EXAMINATION: CT ABDOMEN PELVIS W WO CONTRAST 3/7/2022 11:57 AM HISTORY: COMPARISON: April 29, 2019 DLP: 1664 mGy cm Automated exposure control was also utilized to decrease patient radiation dose TECHNIQUE: Precontrast images of the abdomen and pelvis were obtained, followed by postcontrast images of the abdomen and pelvis in the arterial and delayed phases utilizing a urogram protocol. FINDINGS: The lung bases and base of the heart are unremarkable. URINARY TRACT: Bilateral single renal arteries. Prompt and symmetric cortical enhancement in bilateral kidneys. No solid lesions in the kidneys. No abnormal urothelial enhancement. A large 16 mm calculus is present lower pole of the right kidney. . The ureters are normal. There is a calcification in a right ovarian vein adjacent to the right ureter however there is no evidence of a right ureteral calculus. . Normal bladder. LIVER: There is a small hemangioma in the tip of the left lobe of the liver on image 20 series 4 this becomes isodense with the liver on the delayed sequence. Granulomatous calcification is present. The hepatic vasculature is patent. BILIARY SYSTEM: The gallbladder is surgically absent. PANCREAS: There is no focal pancreatic lesion. SPLEEN: Unremarkable. ADRENALS: Bilateral adrenal glands are unremarkable. RETROPERITONEUM: No mass, lymphadenopathy or hemorrhage. GI TRACT: No evidence of obstruction or bowel wall thickening. OTHER:  The osseous structures and soft tissues demonstrate no worrisome lesions. Vascular calcifications present abdominal aorta. PELVIS: No mass lesion, fluid collection or significant lymphadenopathy is seen in the pelvis. 1. 16 mm calculus lower pole the right kidney. 2. Small hemangioma left lobe of the liver. 3. Vascular calcifications noted in the abdominal aorta. Signed by Dr Sheryle Hammersmith    XR ABDOMEN (KUB) (SINGLE AP VIEW)    Result Date: 3/24/2022  Exam:   XR ABDOMEN (KUB) (SINGLE AP VIEW)  Date:  3/24/2022 History:  Female, age  57 years; preop COMPARISON:  KUB dated March 7, 2022 Findings : Nonobstructive bowel gas pattern. Right nephrolithiasis redemonstrated, with a 7 mm calcification projected over the lower pole of the right kidney. No acute osseous pathology. Right nephrolithiasis. Signed by Dr Ana Paula Poe    XR ABDOMEN (KUB) (SINGLE AP VIEW)    Result Date: 3/7/2022  EXAMINATION: XR ABDOMEN (KUB) (SINGLE AP VIEW) 3/7/2022 10:55 AM HISTORY: XR ABDOMEN (KUB) (SINGLE AP VIEW) 3/7/2022 9:33 AM HISTORY: N20.0 COMPARISON: None FINDINGS: There is a nonobstructive bowel gas pattern. 8 mm calculus is present projecting over the lower pole the right kidney. Left renal contour is unremarkable. . Evaluation for free intraperitoneal air is limited on a supine radiograph, but there are no definite findings of free intraperitoneal air. Granulomatous injection sites project over the left buttocks. The osseous structures demonstrate no acute abnormality. 1. Right nephrolithiasis. There is an 8 mm calculus projecting over lower pole right kidney. Signed by Dr Maritza Trent    XR CHEST PORTABLE    Result Date: 3/24/2022  EXAM: XR CHEST PORTABLE INDICATION: Hypotension COMPARISON: None available. FINDINGS: Cardiac silhouette is enlarged. Central vascular congestion is noted. No pleural effusion, pneumothorax, or focal consolidation. No definite interstitial opacity. Old deformity in the RIGHT AC joint. No acute osseous findings. Central vascular congestion without pulmonary edema. Mild cardiomegaly. Signed by Dr Benjie Lambert      2D Echo: 03/24/2022  Summary   Normal left ventricular size with preserved LV function and an estimated   ejection fraction of approximately 60-65%. Normal global longitudinal   strain pattern with average GLS -21.1%. Normal left ventricular wall   thickness. Normal diastolic filling pattern. Normal right ventricular size with preserved RV function (TAPSE 24 mm). Trace tricuspid regurgitation with normal estimated RVSP.    No evidence of significant inter-atrial communications by color flow   Doppler only.   No evidence of significant pericardial effusion is noted. Aortic root and ascending aorta are within normal limits. The rhythm is sinus. Normal resting BP at time of exam.   Normal SVI 58 ml/m2, CI 4.5 L/min/m2, and  dynes/sec/cm-5.       Signature   ----------------------------------------------------------------   Electronically signed by Christiano Aviles(Interpreting physician)   on 03/25/2022 08:42 AM   ----------------------------------------------------------------        Objective:   Vitals:   /60   Pulse 73   Temp 97.7 °F (36.5 °C) (Temporal)   Resp 16   Ht 5' 1\" (1.549 m)   Wt 165 lb (74.8 kg)   SpO2 95%   BMI 31.18 kg/m²       Patient Vitals for the past 24 hrs:   BP Temp Temp src Pulse Resp SpO2   03/25/22 1140 124/60 97.7 °F (36.5 °C) Temporal 73 -- 95 %   03/25/22 0628 132/70 -- -- 88 -- 94 %   03/24/22 2311 (!) 140/76 96.6 °F (35.9 °C) Temporal 93 16 98 %   03/24/22 2040 -- -- -- -- -- 90 %   03/24/22 1928 (!) 149/77 96.4 °F (35.8 °C) Temporal 104 16 96 %   03/24/22 1740 -- -- -- 99 18 100 %   03/24/22 1735 118/79 -- -- 99 18 100 %   03/24/22 1730 (!) 121/91 -- -- 98 18 100 %   03/24/22 1725 (!) 140/88 -- -- 96 19 100 %   03/24/22 1720 134/81 97.2 °F (36.2 °C) Temporal 94 18 100 %   03/24/22 1715 (!) 141/86 -- -- 93 19 100 %   03/24/22 1710 133/76 -- -- 97 21 100 %   03/24/22 1705 123/79 -- -- 96 22 100 %   03/24/22 1656 -- -- -- 95 -- --   03/24/22 1650 (!) 125/94 97 °F (36.1 °C) Temporal 90 17 100 %   03/24/22 1645 124/75 -- -- 90 15 100 %   03/24/22 1640 123/80 -- -- 93 15 100 %   03/24/22 1635 118/72 -- -- 93 18 100 %   03/24/22 1630 118/73 -- -- 93 14 100 %   03/24/22 1625 112/67 -- -- 96 15 100 %   03/24/22 1620 102/77 -- -- 100 15 100 %   03/24/22 1615 113/67 -- -- 100 13 100 %   03/24/22 1610 105/70 -- -- 102 14 100 %   03/24/22 1605 99/73 -- -- 105 15 100 %   03/24/22 1600 85/68 -- -- 108 14 100 %   03/24/22 1555 (!) 86/58 -- -- 108 16 100 %   03/24/22 1550 (!) 74/41 -- -- 109 16 100 %   03/24/22 1545 (!) 89/49 -- -- 115 12 100 %   03/24/22 1540 (!) 78/42 -- -- 109 17 99 %   03/24/22 1535 (!) 59/39 -- -- 95 11 96 %   03/24/22 1530 (!) 56/40 96.9 °F (36.1 °C) Temporal 90 13 95 %   03/24/22 1527 -- 96.9 °F (36.1 °C) Temporal 101 -- --       24HR INTAKE/OUTPUT:      Intake/Output Summary (Last 24 hours) at 3/25/2022 1336  Last data filed at 3/25/2022 0631  Gross per 24 hour   Intake 2329.24 ml   Output 301 ml   Net 2028.24 ml       Physical Exam  Vitals and nursing note reviewed. Constitutional:       General: She is not in acute distress. Appearance: Normal appearance. She is not ill-appearing, toxic-appearing or diaphoretic. HENT:      Head: Normocephalic and atraumatic. Right Ear: External ear normal.      Left Ear: External ear normal.      Nose: Nose normal. No congestion or rhinorrhea. Mouth/Throat:      Mouth: Mucous membranes are moist.      Pharynx: Oropharynx is clear. No oropharyngeal exudate or posterior oropharyngeal erythema. Eyes:      General: No scleral icterus. Right eye: No discharge. Left eye: No discharge. Extraocular Movements: Extraocular movements intact. Conjunctiva/sclera: Conjunctivae normal.      Pupils: Pupils are equal, round, and reactive to light. Cardiovascular:      Rate and Rhythm: Normal rate and regular rhythm. Pulses: Normal pulses. Heart sounds: Normal heart sounds. No murmur heard. No friction rub. No gallop. Pulmonary:      Effort: Pulmonary effort is normal. No respiratory distress. Breath sounds: Normal breath sounds. No stridor. No wheezing, rhonchi or rales. Chest:      Chest wall: No tenderness. Abdominal:      General: Bowel sounds are normal. There is no distension. Palpations: Abdomen is soft. Tenderness: There is no abdominal tenderness. There is no guarding or rebound.    Musculoskeletal:         General: No swelling, tenderness, deformity or signs of injury. Normal range of motion. Cervical back: Normal range of motion and neck supple. No rigidity. No muscular tenderness. Right lower leg: No edema. Left lower leg: No edema. Skin:     General: Skin is warm and dry. Capillary Refill: Capillary refill takes less than 2 seconds. Coloration: Skin is not jaundiced or pale. Findings: No bruising, erythema, lesion or rash. Neurological:      General: No focal deficit present. Mental Status: She is alert and oriented to person, place, and time. Cranial Nerves: No cranial nerve deficit. Sensory: No sensory deficit. Motor: No weakness. Coordination: Coordination normal.   Psychiatric:         Mood and Affect: Mood normal.         Behavior: Behavior normal.         Thought Content: Thought content normal.         Judgment: Judgment normal.         Assessment/plan:     Patient Active Problem List    Diagnosis Date Noted    Renal calculus, right 03/24/2022    Postoperative hypotension 03/24/2022       Hospital Problems           Last Modified POA    * (Principal) Postoperative hypotension 3/24/2022 Yes    Renal calculus, right 3/24/2022 Yes          Principal Problem:    Postoperative hypotension  Active Problems:    Renal calculus, right  Resolved Problems:    * No resolved hospital problems. *        Brief Summary  Ms. Maureen Marte, a 78-year-old female, history of DMT2, HLD, HTN, right renal calculus, admitted to Urology service, after a postop hypotension with SBP in the 70s. Patient has managed in the PACU with phenylephrine, ephedrine, and IV fluid bolus, with resolution of hypotension given suspected/presumed etiology of acute postop hypotension post as a result of drug reaction (Sugammadex). Patient subsequently placed in observation, to rule out any other underlying cause of hypotension prior to being discharged.     Postop Hypotension-resolved  Lactic acidosis  · R/u infectious etiology, although unlikely  · 2D Echocardiogram (03/24/2022): Normal LV size with preserved LV function and estimated EF of 60-65%, with normal diastolic filling.-Summary report as noted above  · H&H stable  · Lactic acid of 3.5 (03/24/2022)-likely from hypotension  · IV fluids  · Repeat lactic acid level 1.7 (03/25/2022)  · Blood cultures  · CXR (03/24/2022): Impression: Central vascular congestion without pulmonary edema. Mild cardiomegaly. · UA unremarkable  · Procalcitonin level 0.25 (03/24/2022)  · Ciprofloxacin 400 mg IV twice daily initiated (start date: 03/25/2022)    Right renal calculus  · Urology on board  · Status post  right renal extracorporeal soft shockwave lithotripsy cystoscopy and right ureteral stent placement (03/24/2022). · Recommend repeat KUB in 2 weeks      Diabetes Mellitus II   Uncontrolled   Hemoglobin A1C: 8.6% (03/24/2022)   Inpatient Regimens to include;  o - Insulin Glargine (Lantus) 25 units subcu nightly  o - Insulin Lispro (Humalog) 7 units subcu pre-meal 3 times a day  o - Insulin Lispro (Humalog) on a Medium dose sliding scale   Monitor POC glucose, and adjust insulin regimen accordingly based on daily insulin requirement. Continue management of other chronic medical conditions - see above and orders. Advance Directive: Full Code    ADULT DIET; Regular         Consults Made:   IP CONSULT TO HOSPITALIST  IP CONSULT TO HOSPITALIST    DVT prophylaxis: Enoxaparin      Discharge planning:   Extensive discussion with patient and family at bedside about findings and no questions answered. Follow-up blood cultures pending-  Disposition as per primary team.      Time Spent is 35 mins in the examination, evaluation, counseling and review of medications, assessment and plan.      Electronically signed by   Layo Samuels MD, MPH, MD,   Internal Medicine Hospitalist   3/25/2022 1:36 PM

## 2022-03-25 NOTE — PROGRESS NOTES
Henok Castro arrived to room # [id number]. Presented with: postoperative hypotension  Mental Status: Patient is oriented, alert, coherent, logical, thought processes intact and able to concentrate and follow conversation. Vitals:    03/24/22 2311   BP: (!) 140/76   Pulse: 93   Resp: 16   Temp: 96.6 °F (35.9 °C)   SpO2: 98%     Patient safety contract and falls prevention contract reviewed with patient No.  Oriented Patient to room. Call light within reach. Yes.   Needs, issues or concerns expressed at this time: no.      Electronically signed by Karen Gonsalez RN on 3/24/2022 at 11:48 PM

## 2022-03-25 NOTE — PROGRESS NOTES
4 Eyes Skin Assessment    Christiano Johnson is being assessed upon: Admission    I agree that Stef Mathews RN, along with Jil Guevara (either 2 RN's or 1 LPN and 1 RN) have performed a thorough Head to Toe Skin Assessment on the patient. ALL assessment sites listed below have been assessed. Areas assessed by both nurses:     [x]   Head, Face, and Ears   [x]   Shoulders, Back, and Chest  [x]   Arms, Elbows, and Hands   [x]   Coccyx, Sacrum, and Ischium  [x]   Legs, Feet, and Heels    Does the Patient have Skin Breakdown?  No    Jimi Prevention initiated: NA  Wound Care Orders initiated: NA    [facility] nurse consulted for Pressure Injury (Stage 3,4, Unstageable, DTI, NWPT, and Complex wounds) and New or Established Ostomies: NA        Primary Nurse eSignature: Hugh Live RN on 3/24/2022 at 11:49 PM      Co-Signer eSignature: Electronically signed by Estephania Fabian LPN on 5/19/00 at 24:16 PM CDT

## 2022-03-25 NOTE — OP NOTE
JARON "[employer]" OF [facility] [address], [address] [facility]                                OPERATIVE REPORT    PATIENT NAME: Joyce Dykes                     :        1960  MED REC NO:   [id number]                              ROOM:       [facility]  ACCOUNT NO:   [de-identified]                           ADMIT DATE: 2022  PROVIDER:     Yolanda Vazquez MD    DATE OF PROCEDURE:  2022    TITLE OF OPERATION:  1. Cystoscopy, right ureteral stent insertion, 5 Divehi x 26 cm.  2.  Right renal ESWL. PREOPERATIVE DIAGNOSIS:  Right renal calculus. POSTOPERATIVE DIAGNOSIS:  Right renal calculus. ANESTHETIC:  General anesthetic. ATTENDING SURGEON:  Yolanda Vazquez MD    HISTORY:  The patient is a 76-year-old female who came in with a left  flank pain. She had a CT urogram that showed no stones in the left  side; however, she had a known stone in the lower pole of the right  kidney that measured 16 mm. She had a KUB and the stone could be seen  in the lower pole of the right kidney, but it only measured about 8 by  KUB, though it was obscured by bowel contents. Therefore, she was  offered right renal ESWL. Given the size of the stone and stone burden,  I recommended that we place a stent at the same time. The patient  understands and agreeable to proceed. The risks and complications of  the procedure were discussed with her including the risk of infection,  injury to kidney, perinephric scarring, perinephric hematoma. We  discussed the risk of not being able to pass stone fragments and needing  secondary intervention. We also discussed the need for repeat or  adjuvant procedures, pain from the stent, etc.  She understands and  agrees to proceed. DESCRIPTION OF PROCEDURE:  The patient was brought to the operating  room, underwent general anesthetic. She was placed in the lithotomy  position.   Her genitalia was prepped and draped per routine sterile  fashion. She received a preoperative antibiotic and time-out was  performed. A 22-Uruguayan cystoscope was inserted into the meatus. I then  examined the bladder with a 30 and 70-degree lens. She has previously  had some hematuria, so I carefully examined the bladder and the bladder  was completely normal.  The right ureteral orifice was identified. This  was intubated with a 0.035 sensor-tip guidewire with the aid of a  5-Uruguayan catheter and this was advanced under fluoroscopic guidance up  into the right kidney. I could see the stone in the lower pole below  the wire. I then passed the 5-Uruguayan open-ended ureteral catheter up to  just above the stone and measured for the stent. The catheter was  removed. Then, a 5 Uruguayan x 26 cm right double-J ureteral stent was  advanced using fluoroscopic guidance. The proximal end was coiled up in  the kidney just above the stone and the distal end was coiled in the  bladder. I did leave a string attached to the end of the stent. She was then taken out of lithotomy position and positioned for right  renal ESWL. The biplanar fluoroscopy was used to localize the stone. She received 3000 shock waves with a power level of 7. At the end of  the treatment, the stone was much less dense and there was only a small  density that could be visualized. This was consistent with  fragmentation of the stone. She will need to follow up in 2 weeks with  a KUB to see if the stone is well fragmented, and if it is, the stent  can be removed. The estimated blood loss was 0 mL.         Sreedhar Campoverde MD    D: 03/24/2022 16:11:02      T: 03/25/2022 0:14:37     [email or web address]  Job#: [id number]     Doc#: [id number]    CC:

## 2022-03-25 NOTE — PROGRESS NOTES
Urology Progress Note      SUBJECTIVE: Patient down to echo. No complaints overnight per     OBJECTIVE: Her blood pressure has been okay since admission from PACU    REVIEW OF SYSTEMS:   Review of Systems      Physical  VITALS:  /70   Pulse 88   Temp 96.6 °F (35.9 °C) (Temporal)   Resp 16   Ht 5' 1\" (1.549 m)   Wt 165 lb (74.8 kg)   SpO2 94%   BMI 31.18 kg/m²   TEMPERATURE:  Current - Temp: 96.6 °F (35.9 °C); Max - Temp  Av.8 °F (35.4 °C)  Min: 93.9 °F (34.4 °C)  Max: 97.2 °F (36.2 °C)   24 HR I&O   Intake/Output Summary (Last 24 hours) at 3/25/2022 0748  Last data filed at 3/25/2022 0631  Gross per 24 hour   Intake 2329.24 ml   Output 301 ml   Net 2028.24 ml     BACK: flank tenderness: right  ABDOMEN:  soft, non-distended and non-tender  HEART:  normal rate and regular rhythm  CHEST: Normal respiratory effort  GENITAL/URINARY: Normal    Data       CBC:   Recent Labs     22  1300 22  1300 22  1600 22  2201 22  0403   WBC 4.3*  --  7.1  --  3.3*   HGB 12.5   < > 11.7* 12.1 11.1*   HCT 42.1   < > 38.9 40.6 37.6     --  249  --  216    < > = values in this interval not displayed. BMP:    Recent Labs     22  1310 22  1600 22  0403    139 134*   K 4.2 3.4* 4.6    105 103   CO2 24 23 18*   BUN 16 13 16   CREATININE 0.9 0.9 1.0*   GLUCOSE 160* 284* 304*       No results for input(s): LABURIN in the last 72 hours. No results for input(s): BC in the last 72 hours. No results for input(s): Darcie Plunkett in the last 72 hours. Radiology:      Imaging studies:      ASSESSMENT AND PLAN    Patient Active Problem List   Diagnosis    Renal calculus, right    Postoperative hypotension       1. Right renal calculus status post right ESWL and stent placement. H&H has been stable creatinine okay. Okay to dismiss from  standpoint when cleared by hospitalist service. Follow-up in 2 weeks with KUB. 2.  Postop hypotension in PACU. Presumed from drug reaction questionable sugammadex. After fluid bolus her blood pressure has been okay overnight. H&H okay.   Dismissed to home if cleared by hospitalist    Leola Freitas MD

## 2022-03-25 NOTE — DISCHARGE SUMMARY
also has hypertension so we held her antihypertensive medications. At this time it is felt that she could return to her regular medical regimen and be discharged to home and follow-up as previously scheduled    Consultants:   [place] TO HOSPITALIST    Time Spent on Discharge:  Less than 30 minutes were spent in patient examination, evaluation, counseling as well as medication reconciliation, prescriptions for required medications, discharge plan and follow up. Surgeries/Procedures Performed:  Procedure(s):  RIGHT RENAL EXTRACORPOREAL SHOCK WAVE LITHOTRIPSY  CYSTOSCOPY URETERAL STENT INSERTION on 3/24/2022    Treatments:   IV hydration, antibiotics: Cipro, analgesia: , steroids: solu-cortef and surgery: See above    Significant Diagnostic Studies:   Recent Labs:  CBC:   Lab Results   Component Value Date    WBC 3.3 03/25/2022    RBC 4.79 03/25/2022    HGB 11.1 03/25/2022    HCT 37.6 03/25/2022    MCV 78.5 03/25/2022    MCH 23.2 03/25/2022    MCHC 29.5 03/25/2022    RDW 16.6 03/25/2022     03/25/2022     BMP:    Lab Results   Component Value Date    GLUCOSE 304 03/25/2022     03/25/2022    K 4.6 03/25/2022     03/25/2022    CO2 18 03/25/2022    ANIONGAP 13 03/25/2022    BUN 16 03/25/2022    CREATININE 1.0 03/25/2022    CALCIUM 8.0 03/25/2022    LABGLOM 56 03/25/2022    GFRAA >59 03/25/2022       Radiology Last 7 Days:  XR ABDOMEN (KUB) (SINGLE AP VIEW)    Result Date: 3/24/2022  Right nephrolithiasis. Signed by Dr Ky Coello    XR CHEST PORTABLE    Result Date: 3/24/2022  Central vascular congestion without pulmonary edema. Mild cardiomegaly.  Signed by Dr Chema Garibay      Discharge Plan   Disposition: Home    Provider Follow-Up:   NAIDA Foote - [address]  [phone]    On 4/7/2022  eswl follow up with KUB prior at 11:15 am       Hospital/Incidental Findings Requiring Follow-Up:  Follow-up on right renal

## 2022-03-25 NOTE — CARE COORDINATION
Initial CM Assessment    Initial Assessment Completed at bedside with:    [x]   Patient  []   Family/Caregiver/Guardian   []   Other:      Patient Contact Information:  [address] [employer] [address]  [phone] (home)   Above information verified? [x]   Yes  []   No    ADLS:    Independent   Support System:   Spouse/Significant Other  Plan to return to current housing:   [x]   Yes  []   No    Transportation plan for Discharge:  Spouse     Do you have any unmet social needs that would keep you from returning home safely:  []   Yes  [x]   No              Unmet Social Needs Notes:       Had [address] [facility] prior to admission:    []   Yes  [x]   No    Currently ACTIVE with Home Health CARE:    []   Yes  [x]   No  []   Interested at discharge  [facility]:      Current PCP:  Kulwant Sandoval MD  PCP verified? [x]   Yes  []   No    Have you been vaccinated for COVID-19 (SARS-CoV-2)? [x]   Yes  []   No                   If so, when? Which :  []   Next One's On Me (NOOM)  []   Moderna  []   Ronaldo Millard  []   Other:       Pharmacy:    [facility], [address] [place] [address] [phone] - F [phone]  [facility]  Phone: [phone] Fax: [address] [place] [address]  Phone: [phone] Fax: [phone]    Prefer to use Meds to Bed? []   Yes  [x]   No  Potential assistance purchasing medications?      []   Yes  [x]   No    Active with HD/PD prior to admission:           []   Yes  [x]   No  HD Center:       Financial:  Payor: Christina Domingo / Plan: [facility] / Product Type: *No Product type* /     Pre-Cert required for SNF:   [x]   Yes  []   No    Patient Deficits:  []   Yes   [x]   No    If yes:  []   Confusion/Memory  []   Visual  []   Motor/Sensory         []   Right arm         []   Right leg         [] Left arm         []   Left leg  []   Language/Speech         []   Aphasia         []   Dysarthria         []   Swallow         Zina Coma Scale  Eye Opening: Spontaneous  Best Verbal Response: Oriented  Best Motor Response: Obeys commands  Zina Coma Scale Score: 15    Patient Deficit Notes:        Additional CM/SW Notes:        Rut Posey and/or her family were provided with choice of provider:  [x]   Yes   []   No         [address]

## 2022-03-27 LAB — URINE CULTURE, ROUTINE: NORMAL

## 2022-03-29 ENCOUNTER — TELEPHONE (OUTPATIENT)
Dept: UROLOGY | Age: 62
End: 2022-03-29

## 2022-03-30 LAB — BLOOD CULTURE, ROUTINE: NORMAL

## 2022-04-06 ENCOUNTER — TRANSCRIBE ORDERS (OUTPATIENT)
Dept: ADMINISTRATIVE | Facility: HOSPITAL | Age: 62
End: 2022-04-06

## 2022-04-06 DIAGNOSIS — R91.1 PULMONARY NODULE: Primary | ICD-10-CM

## 2022-04-07 ENCOUNTER — HOSPITAL ENCOUNTER (OUTPATIENT)
Dept: GENERAL RADIOLOGY | Age: 62
Discharge: HOME | End: 2022-04-07
Payer: COMMERCIAL

## 2022-04-07 ENCOUNTER — PROCEDURE VISIT (OUTPATIENT)
Dept: UROLOGY | Age: 62
End: 2022-04-07
Payer: COMMERCIAL

## 2022-04-07 DIAGNOSIS — N20.0 RIGHT RENAL STONE: Primary | ICD-10-CM

## 2022-04-07 DIAGNOSIS — N20.0 RENAL CALCULUS, RIGHT: ICD-10-CM

## 2022-04-07 PROCEDURE — 52310 CYSTOSCOPY AND TREATMENT: CPT | Performed by: UROLOGY

## 2022-04-07 PROCEDURE — 74018 RADEX ABDOMEN 1 VIEW: CPT

## 2022-04-07 NOTE — PROGRESS NOTES
Ita White is a 57 y.o. female who presents today   Chief Complaint   Patient presents with    Cystoscopy     I am here today to have my stent removed. HPI: Patient underwent cystoscopy right ureteral stent insertion and right renal ESWL for a 16mm right lower pole stone. She came in today to see the nurse practitioner for stent removal.  Stent did have a string attached however today string was not visible therefore she requires a cystoscopy for removal of the stent. KUB shows a stone no longer visible consistent with fragmentation. There is no stones along the course of the stent. She said she did pass stone fragments.     Past Medical History:   Diagnosis Date    Diabetes mellitus ([address]     Hyperlipidemia     Hypertension     Kidney stones        Past Surgical History:   Procedure Laterality Date    APPENDECTOMY      CHOLECYSTECTOMY      CYSTOSCOPY Right 3/24/2022    CYSTOSCOPY URETERAL STENT INSERTION performed by Ana Escalera MD at [facility] LITHOTRIPSY Right 3/24/2022    RIGHT RENAL EXTRACORPOREAL SHOCK WAVE LITHOTRIPSY performed by Ana Escalera MD at [facility] OR       Current Outpatient Medications   Medication Sig Dispense Refill    phenazopyridine (PYRIDIUM) 100 MG tablet Take 1 tablet by mouth 3 times daily as needed for Pain (for burning and spasms) 30 tablet 1    tamsulosin (FLOMAX) 0.4 MG capsule Take 1 capsule by mouth daily for 14 days 14 capsule 1    LYUMJEV KWIKPEN 100 UNIT/ML SOPN Inject 15 Units into the skin 3 times daily (with meals)       atorvastatin (LIPITOR) 40 MG tablet Take 40 mg by mouth daily       lisinopril (PRINIVIL;ZESTRIL) 20 MG tablet Take 20 mg by mouth daily      amLODIPine (NORVASC) 5 MG tablet Take 5 mg by mouth daily      aspirin 325 MG tablet Take 325 mg by mouth daily      Insulin Degludec (TRESIBA) 100 UNIT/ML SOLN Inject 40 Units into the skin once Nightly       No current facility-administered medications for this visit. No Known Allergies      REVIEW OF SYSTEMS:  Review of Systems    PHYSICAL EXAM:  Physical Exam  Genitourinary:     Comments: Normal-appearing external genitalia. I did not see a string protruding from meatus. This was despite filling her bladder with a red rubber catheter and having her void or her cough. No string seen          Cystoscopy Procedure Note    Indications: Diagnosis,  Indwelling Right Ureteral stent     Pre-operative Diagnosis: Right ureteral stent status post stent placement for ESWL and right renal calculus    Post-operative Diagnosis: Same    Surgeon: Too Taylor MD     Assistants: staff    Anesthesia: Local anesthesia topical 2% lidocaine gel    Procedure Details   The risks, benefits, complications, treatment options, and expected outcomes were discussedwith the patient. The patient concurred with the proposed plan, giving informed consent. Cystoscopy was performed today under local anesthesia, using sterile technique. The patient was placed in the lithotomy position, prepped with Hibiclens, and draped in the usual sterile fashion. A 21 Setswana sheath rigid cystoscope was used to inspect both the urethra and bladder. A stent was seenprotruding from the Right ureteral orifice. It was grasped with a grasper and the stent was moved removed in its entirety, without difficulty. Findings:  Anterior urethra: normal without strictures and without scarring. Bladder: Normal mucosa, without lesions . There was mild mucosa edema, irritation from the stent   Ureteral orifice(s) was/were seen right. Ureteral orifice(s) right were in the normal location and right ureteral orifices were effluxing Pyridium stained urine. .  The stent was grasped and removed there is no string seen attached to the stent. Complications:  None;patient tolerated the procedure well. Disposition: To home after observation.            Condition: stable          DATA:  CBC:   Lab Results   Component Value Date    WBC 3.3 03/25/2022    RBC 4.79 03/25/2022    HGB 11.1 03/25/2022    HCT 37.6 03/25/2022    MCV 78.5 03/25/2022    MCH 23.2 03/25/2022    MCHC 29.5 03/25/2022    RDW 16.6 03/25/2022     03/25/2022    MPV 10.3 03/25/2022     BMP:    Lab Results   Component Value Date     03/25/2022    K 4.6 03/25/2022     03/25/2022    CO2 18 03/25/2022    BUN 16 03/25/2022    LABALBU 4.7 04/29/2019    CREATININE 1.0 03/25/2022    CALCIUM 8.0 03/25/2022    GFRAA >59 03/25/2022    LABGLOM 56 03/25/2022    GLUCOSE 304 03/25/2022       IMAGING:  I reviewed the KUB. The stone that was treated overlying the right kidney is no longer present Cherelle Foster seen on KUB. There is a calcific density right at L3 this was seen on prior KUB and on prior CT scan this does not appear to be within the lumen of the ureter even though it does project over the stent. 1. Right renal stone  KUB shows there appear to be successful fragmentation of the stone that was treated up in the right kidney. The other calcific density along the course of the ureter I think is outside the ureter. Stent was removed today. She will need to follow-up imaging approximately 3 months with CT urogram is to be scheduled with the nurse practitioner  - AL CYSTOURETHROGRAPHY [address] [facility] [address], STENT, FOREIGN BODY, SIMPLE      Orders Placed This Encounter   Procedures    AL CYSTOURETHROGRAPHY [address] [facility] [address], STENT, FOREIGN BODY, SIMPLE        Return in about 3 months (around 7/7/2022) for Follow-up with Sandra Briggs next visit.

## 2022-04-11 RX ORDER — INSULIN DEGLUDEC INJECTION 100 U/ML
40 INJECTION, SOLUTION SUBCUTANEOUS NIGHTLY
Qty: 15 ML | Refills: 10 | Status: SHIPPED | OUTPATIENT
Start: 2022-04-11

## 2022-04-12 ENCOUNTER — HOSPITAL ENCOUNTER (OUTPATIENT)
Dept: CT IMAGING | Facility: HOSPITAL | Age: 62
End: 2022-04-12

## 2022-04-12 ENCOUNTER — HOSPITAL ENCOUNTER (OUTPATIENT)
Dept: CT IMAGING | Age: 62
Discharge: HOME | End: 2022-04-12
Payer: COMMERCIAL

## 2022-04-12 DIAGNOSIS — R91.1 LUNG NODULE: Primary | ICD-10-CM

## 2022-04-12 DIAGNOSIS — R10.9 FLANK PAIN: ICD-10-CM

## 2022-04-12 DIAGNOSIS — N20.0 KIDNEY STONES: ICD-10-CM

## 2022-04-12 PROCEDURE — 74178 CT ABD&PLV WO CNTR FLWD CNTR: CPT

## 2022-04-12 PROCEDURE — 6360000004 HC RX CONTRAST MEDICATION: Performed by: NURSE PRACTITIONER

## 2022-04-12 RX ADMIN — IOPAMIDOL 75 ML: 755 INJECTION, SOLUTION INTRAVENOUS at 13:51

## 2022-04-20 ENCOUNTER — TELEMEDICINE (OUTPATIENT)
Dept: ENDOCRINOLOGY | Facility: CLINIC | Age: 62
End: 2022-04-20

## 2022-04-20 DIAGNOSIS — E11.59 HYPERTENSION ASSOCIATED WITH DIABETES: Primary | ICD-10-CM

## 2022-04-20 DIAGNOSIS — I15.2 HYPERTENSION ASSOCIATED WITH DIABETES: Primary | ICD-10-CM

## 2022-04-20 DIAGNOSIS — E11.69 MIXED DIABETIC HYPERLIPIDEMIA ASSOCIATED WITH TYPE 2 DIABETES MELLITUS: ICD-10-CM

## 2022-04-20 DIAGNOSIS — Z79.4 TYPE 2 DIABETES MELLITUS WITH HYPERGLYCEMIA, WITH LONG-TERM CURRENT USE OF INSULIN: ICD-10-CM

## 2022-04-20 DIAGNOSIS — E78.2 MIXED DIABETIC HYPERLIPIDEMIA ASSOCIATED WITH TYPE 2 DIABETES MELLITUS: ICD-10-CM

## 2022-04-20 DIAGNOSIS — E11.65 TYPE 2 DIABETES MELLITUS WITH HYPERGLYCEMIA, WITH LONG-TERM CURRENT USE OF INSULIN: ICD-10-CM

## 2022-04-20 PROCEDURE — 99214 OFFICE O/P EST MOD 30 MIN: CPT | Performed by: INTERNAL MEDICINE

## 2022-04-20 NOTE — PROGRESS NOTES
CC  Diabetes                                      This was a Telehealth Encounter. Benefits and Disadvantages of a Telehealth Visit were discussed and accepted by patient. .  Patient agreed to receive service through Telehealth visit as patient is being compliant with social distancing recommendations imparted by CDC.     You have chosen to receive care through a telehealth visit.  Do you consent to use a video/audio connection for your medical care today? Yes        History of Present Illness    61 y.o. female     Diabetes Type 2    Duration - since 1999     Complications -  GFR 59-49  Mild neuropathy     Present Monitoring -      Fingersticks -      CGM - now on blanca , see below     Present Regimen -  See below  Carb Intake -   Not high   Exercise -   Walking   Symptoms -   tingling in feet   ==========================================  Physical Exam  There were no vitals taken for this visit.  AOx3  No goiter, no carotid bruit  RRR  CTA  No Edema     ==========================================    Laboratory Workup    Lab Results   Component Value Date    WBC 3.3 (L) 03/25/2022    HGB 11.1 (L) 03/25/2022    HCT 37.6 03/25/2022    MCV 78.5 (L) 03/25/2022     03/25/2022       Lab Results   Component Value Date    GLUCOSE 284 (H) 03/24/2022    BUN 13 03/24/2022    CREATININE 0.9 03/24/2022    EGFRIFNONA >60 03/24/2022    EGFRIFAFRI >59 03/24/2022    BCR 17.4 01/10/2022    K 3.4 (L) 03/24/2022    CO2 23 03/24/2022    CALCIUM 7.8 (L) 03/24/2022    ALBUMIN 4.90 01/10/2022    AST 24 01/10/2022    ALT 15 01/10/2022       Lab Results   Component Value Date    EGFRIFNONA >60 03/24/2022         ==========================================      ICD-10-CM ICD-9-CM   1. Hypertension associated with diabetes (HCC)  E11.59 250.80    I15.2 401.9   2. Mixed diabetic hyperlipidemia associated with type 2 diabetes mellitus (HCC)  E11.69 250.80    E78.2 272.2   3. Type 2 diabetes mellitus with hyperglycemia, with long-term current  use of insulin ([facility])  E11.65 250.00    Z79.4 790.29     V58.67       Diabetes Mellitus    --------------------------------------------------------------------------------------------------------------------------------------------    Glycemic Management   Lab Results   Component Value Date    HGBA1C 8.6 (H) 03/24/2022       Using blanca  Read over the phone     Average glucose      6 am 171  Noon 177  6 pm 205    TIT 59   Below range     --------------------------------------  Tresiba    40 is working very well   ---------------------------------------    Humalog    15 units w meals    Suggest    18/18/20    We spoke about premeal goal of 130   ======================    Couldn't tolerate GLP 1 , severe vomiting   ==========================    Jardiance , side effects     ===========    gvoke         ===========      ==========================================================================  Microvascular Complication Monitoring    Neuropathy                     Mild, little tingle but not anymore   Retinopathy   No   Renal   Yes 3a  GFR   Stage 3 a    Albuminuria   Lab Results   Component Value Date    MALBCRERATIO 10.5 01/04/2022       On ACE / ARB , lisinopril   On SGLT2i , jardiance  On Finerenone  , next appt   In March had lithotripsy  Reevaluate renal function before adding   ==========================================================================    Lipids  Lab Results   Component Value Date    CHOL 137 01/04/2022    TRIG 115 01/04/2022    HDL 41 01/04/2022    LDL 75 01/04/2022       Statin  lipitor 40 mg qhs      ==========================================================================    HTN  There were no vitals taken for this visit.    Lisinopril 20 mg qd     On aspirin 325 mg daily   ==========================================================================    Bone Health    Vit D    Lab Results   Component Value Date    XCDB34QS 32.4 01/04/2022       Calcium intake      ==========================================================================    Thyroid Assessment  Lab Results   Component Value Date    TSH 1.650 01/04/2022           ==========================================================================    Vitamin B12  Lab Results   Component Value Date    DTMQEJDP85 1,074 (H) 01/04/2022         ==========================================================================    Celiac Panel               No orders of the defined types were placed in this encounter.               This document has been electronically signed by Bar Nolan MD on April 20, 2022 10:00 CDT

## 2022-05-18 ENCOUNTER — HOSPITAL ENCOUNTER (OUTPATIENT)
Dept: CT IMAGING | Facility: HOSPITAL | Age: 62
Discharge: HOME | End: 2022-05-18
Admitting: FAMILY MEDICINE

## 2022-05-18 DIAGNOSIS — R91.1 PULMONARY NODULE: ICD-10-CM

## 2022-05-18 LAB — CREAT BLDA-MCNC: 0.9 MG/DL (ref 0.6–1.3)

## 2022-05-18 PROCEDURE — 25010000002 IOPAMIDOL 61 % SOLUTION: Performed by: FAMILY MEDICINE

## 2022-05-18 PROCEDURE — 82565 ASSAY OF CREATININE: CPT

## 2022-05-18 PROCEDURE — 71260 CT THORAX DX C+: CPT

## 2022-05-18 RX ADMIN — IOPAMIDOL 100 ML: 612 INJECTION, SOLUTION INTRAVENOUS at 12:13

## 2022-07-06 DIAGNOSIS — N20.0 RIGHT RENAL STONE: Primary | ICD-10-CM

## 2022-07-11 ENCOUNTER — HOSPITAL ENCOUNTER (OUTPATIENT)
Dept: GENERAL RADIOLOGY | Age: 62
Discharge: HOME | End: 2022-07-11
Payer: COMMERCIAL

## 2022-07-11 DIAGNOSIS — N20.0 RIGHT RENAL STONE: ICD-10-CM

## 2022-07-11 PROCEDURE — 74018 RADEX ABDOMEN 1 VIEW: CPT | Performed by: RADIOLOGY

## 2022-07-11 PROCEDURE — 74018 RADEX ABDOMEN 1 VIEW: CPT

## 2022-07-12 ENCOUNTER — OFFICE VISIT (OUTPATIENT)
Dept: UROLOGY | Age: 62
End: 2022-07-12
Payer: COMMERCIAL

## 2022-07-12 VITALS
SYSTOLIC BLOOD PRESSURE: 126 MMHG | TEMPERATURE: 98 F | HEIGHT: 61 IN | BODY MASS INDEX: 30.96 KG/M2 | WEIGHT: 164 LBS | DIASTOLIC BLOOD PRESSURE: 82 MMHG

## 2022-07-12 DIAGNOSIS — N20.0 NEPHROLITHIASIS: Primary | ICD-10-CM

## 2022-07-12 LAB
BACTERIA URINE, POC: 0
BILIRUBIN URINE: 0 MG/DL
BLOOD, URINE: NEGATIVE
CASTS URINE, POC: 0
CLARITY: CLEAR
COLOR: YELLOW
CRYSTALS URINE, POC: 0
EPI CELLS URINE, POC: NORMAL
GLUCOSE URINE: NORMAL
KETONES, URINE: NEGATIVE
LEUKOCYTE EST, POC: NORMAL
NITRITE, URINE: NEGATIVE
PH UA: 5.5 (ref 4.5–8)
PROTEIN UA: NEGATIVE
RBC URINE, POC: 1
SPECIFIC GRAVITY UA: 1.02 (ref 1–1.03)
UROBILINOGEN, URINE: NORMAL
WBC URINE, POC: 0
YEAST URINE, POC: 0

## 2022-07-12 PROCEDURE — G8417 CALC BMI ABV UP PARAM F/U: HCPCS | Performed by: NURSE PRACTITIONER

## 2022-07-12 PROCEDURE — G8427 DOCREV CUR MEDS BY ELIG CLIN: HCPCS | Performed by: NURSE PRACTITIONER

## 2022-07-12 PROCEDURE — 1036F TOBACCO NON-USER: CPT | Performed by: NURSE PRACTITIONER

## 2022-07-12 PROCEDURE — 3017F COLORECTAL CA SCREEN DOC REV: CPT | Performed by: NURSE PRACTITIONER

## 2022-07-12 PROCEDURE — 99213 OFFICE O/P EST LOW 20 MIN: CPT | Performed by: NURSE PRACTITIONER

## 2022-07-12 PROCEDURE — 81001 URINALYSIS AUTO W/SCOPE: CPT | Performed by: NURSE PRACTITIONER

## 2022-07-12 ASSESSMENT — ENCOUNTER SYMPTOMS
VOMITING: 0
BACK PAIN: 0
NAUSEA: 0
ABDOMINAL PAIN: 0
ABDOMINAL DISTENTION: 0

## 2022-07-12 NOTE — PROGRESS NOTES
Sena Escobar is a 57 y.o., female, Established patient who presents today   Chief Complaint   Patient presents with    Follow-up     I am here today for my 3 month follow up for stones, my KUB is done        HPI   Patient presents for follow-up after undergoing ESWL on 3/24/2022 for a 16mm right lower pole stone. She is currently asymptomatic and denies any fevers, chills, nausea, vomiting, hematuria, dysuria, abdominal pain, flank pain. She reports she was having some mild lower urinary tract symptoms about a month ago, but feels pretty good today. She does have a KUB for review today. REVIEW OF SYSTEMS:  Review of Systems   Constitutional: Negative for chills and fever. Gastrointestinal: Negative for abdominal distention, abdominal pain, nausea and vomiting. Genitourinary: Negative for difficulty urinating, dysuria, flank pain, frequency, hematuria and urgency. Musculoskeletal: Negative for back pain and gait problem. Psychiatric/Behavioral: Negative for agitation and confusion. PHYSICAL EXAM:  /82   Temp 98 °F (36.7 °C) (Temporal)   Ht 5' 1\" (1.549 m)   Wt 164 lb (74.4 kg)   BMI 30.99 kg/m²   Physical Exam  Vitals and nursing note reviewed. Constitutional:       General: She is not in acute distress. Appearance: Normal appearance. She is not ill-appearing. Pulmonary:      Effort: Pulmonary effort is normal. No respiratory distress. Abdominal:      General: There is no distension. Tenderness: There is no abdominal tenderness. There is no right CVA tenderness or left CVA tenderness. Neurological:      Mental Status: She is alert and oriented to person, place, and time. Mental status is at baseline. Psychiatric:         Mood and Affect: Mood normal.         Behavior: Behavior normal.       IMAGING:  Impression   Abundant fecal residue in colon.  Several pelvic phleboliths.  No discernible calcifications about the right renal shadow.    Recommendation:    Follow up as clinically indicated. Electronically Signed by Juarez Miller MD at 11-Jul-2022 04:55:30 PM        I have reviewed KUB imaging which reveals several stable pelvic phleboliths, however, there is no obvious stone in the kidneys or along the course of the ureter. ASSESSMENT/PLAN  1. Nephrolithiasis  Patient is asymptomatic today. KUB does not reveal any significant stone disease. We will plan for follow-up in about a year with KUB prior. - XR ABDOMEN (KUB) (SINGLE AP VIEW); Future      Orders Placed This Encounter   Procedures    XR ABDOMEN (KUB) (SINGLE AP VIEW)     Standing Status:   Future     Standing Expiration Date:   7/12/2023        Return in about 1 year (around 7/12/2023) for KUB prior. An electronic signature was used to authenticate this note. NADIA CELESTE - CNP    All information inputted into the note by the MA to include chief complaint, past medical history, past surgical history, medications, allergies, social and family history and review of systems has been reviewed and updated as needed by me. EMR Dragon/transcription disclaimer: Much of this document is electronic transcription/translation of spoken language to printed text. The electronic translation of spoken language may be erroneous or, at times, nonsensical words or phrases may be inadvertently transcribed.  Although I have reviewed the document for such errors, some may still exist.

## 2023-01-19 ENCOUNTER — DOCUMENTATION (OUTPATIENT)
Dept: CT IMAGING | Facility: HOSPITAL | Age: 63
End: 2023-01-19
Payer: COMMERCIAL

## 2023-01-19 NOTE — PROGRESS NOTES
left message for nurse @ [facility] to call me back about f/u imaging for incidental lung findings. Patients phone numbers do not work. if office has no record of f/u imaging send a past due letter

## 2023-01-20 ENCOUNTER — DOCUMENTATION (OUTPATIENT)
Dept: CT IMAGING | Facility: HOSPITAL | Age: 63
End: 2023-01-20
Payer: COMMERCIAL

## 2023-01-20 NOTE — PROGRESS NOTES
Talk to Cathie Gonzalez at [facility] office [phone] and let her know patient has not had f/u imaging. She stated that they were planning on doing a PET scan at one point and patient was agreeable. She will try to reach out and get ahold of patient.

## 2023-01-26 ENCOUNTER — TRANSCRIBE ORDERS (OUTPATIENT)
Dept: ADMINISTRATIVE | Facility: HOSPITAL | Age: 63
End: 2023-01-26
Payer: COMMERCIAL

## 2023-01-26 DIAGNOSIS — R91.1 PULMONARY NODULE: Primary | ICD-10-CM

## 2023-02-09 ENCOUNTER — TRANSCRIBE ORDERS (OUTPATIENT)
Dept: ADMINISTRATIVE | Facility: HOSPITAL | Age: 63
End: 2023-02-09
Payer: COMMERCIAL

## 2023-02-09 ENCOUNTER — HOSPITAL ENCOUNTER (OUTPATIENT)
Dept: CT IMAGING | Facility: HOSPITAL | Age: 63
Discharge: HOME | End: 2023-02-09
Admitting: FAMILY MEDICINE
Payer: COMMERCIAL

## 2023-02-09 DIAGNOSIS — R91.1 PULMONARY NODULE: ICD-10-CM

## 2023-02-09 DIAGNOSIS — R91.1 PULMONARY NODULE: Primary | ICD-10-CM

## 2023-02-09 LAB — CREAT BLDA-MCNC: 1 MG/DL (ref 0.6–1.3)

## 2023-02-09 PROCEDURE — 82565 ASSAY OF CREATININE: CPT

## 2023-02-09 PROCEDURE — 25010000002 IOPAMIDOL 61 % SOLUTION: Performed by: FAMILY MEDICINE

## 2023-02-09 PROCEDURE — 71260 CT THORAX DX C+: CPT

## 2023-02-09 RX ADMIN — IOPAMIDOL 75 ML: 612 INJECTION, SOLUTION INTRAVENOUS at 11:59

## 2023-02-10 ENCOUNTER — TRANSCRIBE ORDERS (OUTPATIENT)
Dept: ADMINISTRATIVE | Facility: HOSPITAL | Age: 63
End: 2023-02-10
Payer: COMMERCIAL

## 2023-02-10 DIAGNOSIS — R91.1 PULMONARY NODULE: Primary | ICD-10-CM

## 2023-11-21 ENCOUNTER — TRANSCRIBE ORDERS (OUTPATIENT)
Dept: ADMINISTRATIVE | Facility: HOSPITAL | Age: 63
End: 2023-11-21
Payer: COMMERCIAL

## 2023-11-21 DIAGNOSIS — R91.1 PULMONARY NODULE: Primary | ICD-10-CM

## 2023-12-14 ENCOUNTER — HOSPITAL ENCOUNTER (OUTPATIENT)
Dept: CT IMAGING | Facility: HOSPITAL | Age: 63
Discharge: HOME | End: 2023-12-14
Admitting: FAMILY MEDICINE
Payer: COMMERCIAL

## 2023-12-14 DIAGNOSIS — R91.1 PULMONARY NODULE: ICD-10-CM

## 2023-12-14 LAB — CREAT BLDA-MCNC: 1 MG/DL (ref 0.6–1.3)

## 2023-12-14 PROCEDURE — 71260 CT THORAX DX C+: CPT

## 2023-12-14 PROCEDURE — 82565 ASSAY OF CREATININE: CPT

## 2023-12-14 PROCEDURE — 25510000001 IOPAMIDOL 61 % SOLUTION: Performed by: FAMILY MEDICINE

## 2023-12-14 RX ADMIN — IOPAMIDOL 100 ML: 612 INJECTION, SOLUTION INTRAVENOUS at 08:39

## 2024-05-06 ENCOUNTER — OFFICE VISIT (OUTPATIENT)
Dept: OBGYN CLINIC | Age: 64
End: 2024-05-06
Payer: COMMERCIAL

## 2024-05-06 VITALS
HEIGHT: 61 IN | BODY MASS INDEX: 32.85 KG/M2 | WEIGHT: 174 LBS | HEART RATE: 72 BPM | DIASTOLIC BLOOD PRESSURE: 79 MMHG | SYSTOLIC BLOOD PRESSURE: 136 MMHG

## 2024-05-06 DIAGNOSIS — Z12.72 SCREENING FOR VAGINAL CANCER: ICD-10-CM

## 2024-05-06 DIAGNOSIS — N93.0 POSTCOITAL BLEEDING: ICD-10-CM

## 2024-05-06 DIAGNOSIS — Z01.419 WELL WOMAN EXAM: ICD-10-CM

## 2024-05-06 DIAGNOSIS — Z11.51 SCREENING FOR HPV (HUMAN PAPILLOMAVIRUS): Primary | ICD-10-CM

## 2024-05-06 DIAGNOSIS — Z12.31 SCREENING MAMMOGRAM FOR BREAST CANCER: ICD-10-CM

## 2024-05-06 PROCEDURE — 99386 PREV VISIT NEW AGE 40-64: CPT | Performed by: OBSTETRICS & GYNECOLOGY

## 2024-05-06 RX ORDER — INSULIN GLARGINE 100 [IU]/ML
40 INJECTION, SOLUTION SUBCUTANEOUS NIGHTLY
COMMUNITY
Start: 2022-09-29

## 2024-05-06 ASSESSMENT — ENCOUNTER SYMPTOMS
RESPIRATORY NEGATIVE: 1
CONSTIPATION: 1

## 2024-05-06 NOTE — PROGRESS NOTES
SUBJECTIVE:  Libertad Cruz is a 63 y.o.  who is here for annual exam and c/o postcoital bleeding. Pt is S/P hyst.      Review of Systems   Constitutional: Negative.    Respiratory: Negative.     Cardiovascular: Negative.    Gastrointestinal:  Positive for constipation.   Genitourinary:         Postcoital bleeding   Musculoskeletal: Negative.    Neurological: Negative.    Psychiatric/Behavioral: Negative.     All other systems reviewed and are negative.      GYN HX:   No LMP recorded. Patient is postmenopausal.  Abnormal Bleeding/Menses: post coital bleeding  Abnormal pap smear:none    Social History     Substance and Sexual Activity   Sexual Activity Not on file     OB History    No obstetric history on file.         Because violence is so common, we ask all our patients: are you in a relationship or do you live with a person who threatens, hurts, orcontrols you: No    Past Medical History:   Diagnosis Date    Diabetes mellitus (HCC)     Hyperlipidemia     Hypertension     Kidney stones      Past Surgical History:   Procedure Laterality Date    APPENDECTOMY      CHOLECYSTECTOMY      CYSTOSCOPY Right 3/24/2022    CYSTOSCOPY URETERAL STENT INSERTION performed by Malik Eaton MD at [facility] OR    HYSTERECTOMY (CERVIX STATUS UNKNOWN)      LITHOTRIPSY Right 3/24/2022    RIGHT RENAL EXTRACORPOREAL SHOCK WAVE LITHOTRIPSY performed by Malik Eaton MD at [facility] OR     Family History   Problem Relation Age of Onset    Cancer Mother     High Blood Pressure Mother     Kidney Disease Father     High Blood Pressure Father     Diabetes Father      Social History     Tobacco Use    Smoking status: Former     Current packs/day: 0.00     Types: Cigarettes     Quit date: 3/22/2016     Years since quittin.1    Smokeless tobacco: Never   Vaping Use    Vaping Use: Never used   Substance Use Topics    Alcohol use: Never    Drug use: Never     Current Outpatient Medications on File Prior to Visit   Medication Sig Dispense

## 2024-05-07 ENCOUNTER — HOSPITAL ENCOUNTER (OUTPATIENT)
Dept: WOMENS IMAGING | Age: 64
Discharge: HOME | End: 2024-05-07
Payer: COMMERCIAL

## 2024-05-07 ENCOUNTER — HOSPITAL ENCOUNTER (OUTPATIENT)
Dept: ULTRASOUND IMAGING | Age: 64
Discharge: HOME | End: 2024-05-07
Payer: COMMERCIAL

## 2024-05-07 DIAGNOSIS — N93.0 POSTCOITAL BLEEDING: ICD-10-CM

## 2024-05-07 DIAGNOSIS — Z12.31 SCREENING MAMMOGRAM FOR BREAST CANCER: ICD-10-CM

## 2024-05-07 PROCEDURE — 76830 TRANSVAGINAL US NON-OB: CPT

## 2024-05-07 PROCEDURE — 77063 BREAST TOMOSYNTHESIS BI: CPT

## 2024-05-09 LAB
HPV HR 12 DNA SPEC QL NAA+PROBE: NOT DETECTED
HPV16 DNA SPEC QL NAA+PROBE: NOT DETECTED
HPV16+18+H RISK 12 DNA SPEC-IMP: NORMAL
HPV18 DNA SPEC QL NAA+PROBE: NOT DETECTED

## 2025-03-04 NOTE — TELEPHONE ENCOUNTER
Patient is told to take more insulin and watch carbs per    Post-Care Instructions: I reviewed with the patient in detail post-care instructions. Patient is to wear sunprotection, and avoid picking at any of the treated lesions. Pt may apply Vaseline to crusted or scabbing areas. Medical Necessity Information: It is in your best interest to select a reason for this procedure from the list below. All of these items fulfill various CMS LCD requirements except the new and changing color options. Render Post-Care Instructions In Note?: no Show Aperture Variable?: Yes Detail Level: Detailed Consent: The patient's verbal consent was obtained including but not limited to risks of crusting, scabbing, blistering, scarring, darker or lighter pigmentary change, recurrence, incomplete removal and infection. Pared With?: 15 blade Medical Necessity Clause: This procedure was medically necessary because the lesions that were treated were: Spray Paint Text: The liquid nitrogen was applied to the skin utilizing a spray paint frosting technique.

## 2025-03-19 ENCOUNTER — HOSPITAL ENCOUNTER (OUTPATIENT)
Age: 65
Setting detail: OBSERVATION
Discharge: HOME | End: 2025-03-20
Attending: EMERGENCY MEDICINE | Admitting: EMERGENCY MEDICINE
Payer: COMMERCIAL

## 2025-03-19 ENCOUNTER — APPOINTMENT (OUTPATIENT)
Dept: CT IMAGING | Age: 65
End: 2025-03-19
Payer: COMMERCIAL

## 2025-03-19 DIAGNOSIS — E87.3 METABOLIC ALKALOSIS: ICD-10-CM

## 2025-03-19 DIAGNOSIS — R11.2 NAUSEA VOMITING AND DIARRHEA: ICD-10-CM

## 2025-03-19 DIAGNOSIS — R19.7 NAUSEA VOMITING AND DIARRHEA: ICD-10-CM

## 2025-03-19 DIAGNOSIS — N17.9 ACUTE KIDNEY INJURY: Primary | ICD-10-CM

## 2025-03-19 DIAGNOSIS — R91.1 PULMONARY NODULE: ICD-10-CM

## 2025-03-19 DIAGNOSIS — T88.7XXA MEDICATION SIDE EFFECT: ICD-10-CM

## 2025-03-19 LAB
ACETONE SERPL QL SCN: ABNORMAL
ALBUMIN SERPL-MCNC: 4.5 G/DL (ref 3.5–5.2)
ALP SERPL-CCNC: 116 U/L (ref 35–104)
ALT SERPL-CCNC: 16 U/L (ref 10–35)
ANION GAP SERPL CALCULATED.3IONS-SCNC: 16 MMOL/L (ref 8–16)
AST SERPL-CCNC: 28 U/L (ref 10–35)
B PARAP IS1001 DNA NPH QL NAA+NON-PROBE: NOT DETECTED
B PERT.PT PRMT NPH QL NAA+NON-PROBE: NOT DETECTED
BACTERIA URNS QL MICRO: NEGATIVE /HPF
BASE EXCESS VENOUS: 9 MMOL/L
BASOPHILS # BLD: 0 K/UL (ref 0–0.2)
BASOPHILS NFR BLD: 0.2 % (ref 0–1)
BILIRUB SERPL-MCNC: 0.6 MG/DL (ref 0.2–1.2)
BILIRUB UR QL STRIP: NEGATIVE
BUN SERPL-MCNC: 23 MG/DL (ref 8–23)
C PNEUM DNA NPH QL NAA+NON-PROBE: NOT DETECTED
CALCIUM SERPL-MCNC: 9.2 MG/DL (ref 8.8–10.2)
CARBOXYHEMOGLOBIN: 2.3 %
CHLORIDE SERPL-SCNC: 91 MMOL/L (ref 98–107)
CLARITY UR: CLEAR
CO2 SERPL-SCNC: 31 MMOL/L (ref 22–29)
COLOR UR: YELLOW
CREAT SERPL-MCNC: 1.4 MG/DL (ref 0.5–0.9)
CRYSTALS URNS MICRO: ABNORMAL /HPF
EOSINOPHIL # BLD: 0 K/UL (ref 0–0.6)
EOSINOPHIL NFR BLD: 0.1 % (ref 0–5)
EPI CELLS #/AREA URNS AUTO: 2 /HPF (ref 0–5)
ERYTHROCYTE [DISTWIDTH] IN BLOOD BY AUTOMATED COUNT: 15.9 % (ref 11.5–14.5)
FLUAV RNA NPH QL NAA+NON-PROBE: NOT DETECTED
FLUBV RNA NPH QL NAA+NON-PROBE: NOT DETECTED
GLUCOSE BLD-MCNC: 151 MG/DL (ref 70–99)
GLUCOSE SERPL-MCNC: 167 MG/DL (ref 70–99)
GLUCOSE UR STRIP.AUTO-MCNC: NEGATIVE MG/DL
HADV DNA NPH QL NAA+NON-PROBE: NOT DETECTED
HCO3 VENOUS: 34 MMOL/L (ref 23–29)
HCOV 229E RNA NPH QL NAA+NON-PROBE: NOT DETECTED
HCOV HKU1 RNA NPH QL NAA+NON-PROBE: NOT DETECTED
HCOV NL63 RNA NPH QL NAA+NON-PROBE: NOT DETECTED
HCOV OC43 RNA NPH QL NAA+NON-PROBE: NOT DETECTED
HCT VFR BLD AUTO: 47.3 % (ref 37–47)
HGB BLD-MCNC: 14.9 G/DL (ref 12–16)
HGB UR STRIP.AUTO-MCNC: NEGATIVE MG/L
HMPV RNA NPH QL NAA+NON-PROBE: NOT DETECTED
HPIV1 RNA NPH QL NAA+NON-PROBE: NOT DETECTED
HPIV2 RNA NPH QL NAA+NON-PROBE: NOT DETECTED
HPIV3 RNA NPH QL NAA+NON-PROBE: NOT DETECTED
HPIV4 RNA NPH QL NAA+NON-PROBE: NOT DETECTED
HYALINE CASTS #/AREA URNS AUTO: 3 /HPF (ref 0–8)
IMM GRANULOCYTES # BLD: 0.1 K/UL
KETONES UR STRIP.AUTO-MCNC: 40 MG/DL
LACTATE BLDV-SCNC: 1.1 MMOL/L (ref 0.5–1.9)
LEUKOCYTE ESTERASE UR QL STRIP.AUTO: ABNORMAL
LIPASE SERPL-CCNC: 29 U/L (ref 13–60)
LYMPHOCYTES # BLD: 1.2 K/UL (ref 1.1–4.5)
LYMPHOCYTES NFR BLD: 14.7 % (ref 20–40)
M PNEUMO DNA NPH QL NAA+NON-PROBE: NOT DETECTED
MCH RBC QN AUTO: 24.4 PG (ref 27–31)
MCHC RBC AUTO-ENTMCNC: 31.5 G/DL (ref 33–37)
MCV RBC AUTO: 77.4 FL (ref 81–99)
METHEMOGLOBIN VENOUS: 1.1 %
MONOCYTES # BLD: 0.6 K/UL (ref 0–0.9)
MONOCYTES NFR BLD: 7 % (ref 0–10)
NEUTROPHILS # BLD: 6.3 K/UL (ref 1.5–7.5)
NEUTS SEG NFR BLD: 77.4 % (ref 50–65)
NITRITE UR QL STRIP.AUTO: NEGATIVE
O2 CONTENT, VEN: 16 ML/DL
O2 SAT, VEN: 81 %
O2 THERAPY: ABNORMAL
PCO2 VENOUS: 43 MMHG (ref 40–50)
PERFORMED ON: ABNORMAL
PH UR STRIP.AUTO: 7 [PH] (ref 5–8)
PH VENOUS: 7.5 (ref 7.35–7.45)
PLATELET # BLD AUTO: 317 K/UL (ref 130–400)
PMV BLD AUTO: 9.4 FL (ref 9.4–12.3)
PO2 VENOUS: 45 MMHG
POTASSIUM SERPL-SCNC: 4.2 MMOL/L (ref 3.5–5.1)
PROT SERPL-MCNC: 7.6 G/DL (ref 6.4–8.3)
PROT UR STRIP.AUTO-MCNC: ABNORMAL MG/DL
RBC # BLD AUTO: 6.11 M/UL (ref 4.2–5.4)
RBC #/AREA URNS AUTO: 2 /HPF (ref 0–4)
RSV RNA NPH QL NAA+NON-PROBE: NOT DETECTED
RV+EV RNA NPH QL NAA+NON-PROBE: NOT DETECTED
SARS-COV-2 RNA NPH QL NAA+NON-PROBE: NOT DETECTED
SODIUM SERPL-SCNC: 138 MMOL/L (ref 136–145)
SP GR UR STRIP.AUTO: 1.04 (ref 1–1.03)
UROBILINOGEN UR STRIP.AUTO-MCNC: 1 E.U./DL
WBC # BLD AUTO: 8.2 K/UL (ref 4.8–10.8)
WBC #/AREA URNS AUTO: 62 /HPF (ref 0–5)

## 2025-03-19 PROCEDURE — 87040 BLOOD CULTURE FOR BACTERIA: CPT

## 2025-03-19 PROCEDURE — 74177 CT ABD & PELVIS W/CONTRAST: CPT

## 2025-03-19 PROCEDURE — 2500000003 HC RX 250 WO HCPCS

## 2025-03-19 PROCEDURE — 82962 GLUCOSE BLOOD TEST: CPT

## 2025-03-19 PROCEDURE — 87077 CULTURE AEROBIC IDENTIFY: CPT

## 2025-03-19 PROCEDURE — 83605 ASSAY OF LACTIC ACID: CPT

## 2025-03-19 PROCEDURE — 82803 BLOOD GASES ANY COMBINATION: CPT

## 2025-03-19 PROCEDURE — 36415 COLL VENOUS BLD VENIPUNCTURE: CPT

## 2025-03-19 PROCEDURE — G0378 HOSPITAL OBSERVATION PER HR: HCPCS

## 2025-03-19 PROCEDURE — 96361 HYDRATE IV INFUSION ADD-ON: CPT

## 2025-03-19 PROCEDURE — 87086 URINE CULTURE/COLONY COUNT: CPT

## 2025-03-19 PROCEDURE — 96375 TX/PRO/DX INJ NEW DRUG ADDON: CPT

## 2025-03-19 PROCEDURE — 83690 ASSAY OF LIPASE: CPT

## 2025-03-19 PROCEDURE — 85025 COMPLETE CBC W/AUTO DIFF WBC: CPT

## 2025-03-19 PROCEDURE — 82010 KETONE BODYS QUAN: CPT

## 2025-03-19 PROCEDURE — 96374 THER/PROPH/DIAG INJ IV PUSH: CPT

## 2025-03-19 PROCEDURE — 2580000003 HC RX 258

## 2025-03-19 PROCEDURE — 0202U NFCT DS 22 TRGT SARS-COV-2: CPT

## 2025-03-19 PROCEDURE — 99285 EMERGENCY DEPT VISIT HI MDM: CPT

## 2025-03-19 PROCEDURE — 6360000002 HC RX W HCPCS

## 2025-03-19 PROCEDURE — 82009 KETONE BODYS QUAL: CPT

## 2025-03-19 PROCEDURE — 82800 BLOOD PH: CPT

## 2025-03-19 PROCEDURE — 94760 N-INVAS EAR/PLS OXIMETRY 1: CPT

## 2025-03-19 PROCEDURE — 6360000004 HC RX CONTRAST MEDICATION

## 2025-03-19 PROCEDURE — 81001 URINALYSIS AUTO W/SCOPE: CPT

## 2025-03-19 PROCEDURE — 80053 COMPREHEN METABOLIC PANEL: CPT

## 2025-03-19 RX ORDER — TIRZEPATIDE 2.5 MG/.5ML
INJECTION, SOLUTION SUBCUTANEOUS WEEKLY
Status: ON HOLD | COMMUNITY
End: 2025-03-20 | Stop reason: HOSPADM

## 2025-03-19 RX ORDER — 0.9 % SODIUM CHLORIDE 0.9 %
1000 INTRAVENOUS SOLUTION INTRAVENOUS ONCE
Status: COMPLETED | OUTPATIENT
Start: 2025-03-19 | End: 2025-03-19

## 2025-03-19 RX ORDER — SODIUM CHLORIDE 0.9 % (FLUSH) 0.9 %
5-40 SYRINGE (ML) INJECTION PRN
Status: DISCONTINUED | OUTPATIENT
Start: 2025-03-19 | End: 2025-03-20 | Stop reason: HOSPADM

## 2025-03-19 RX ORDER — SODIUM CHLORIDE 9 MG/ML
INJECTION, SOLUTION INTRAVENOUS PRN
Status: DISCONTINUED | OUTPATIENT
Start: 2025-03-19 | End: 2025-03-20 | Stop reason: HOSPADM

## 2025-03-19 RX ORDER — DEXTROSE MONOHYDRATE 100 MG/ML
INJECTION, SOLUTION INTRAVENOUS CONTINUOUS PRN
Status: DISCONTINUED | OUTPATIENT
Start: 2025-03-19 | End: 2025-03-20 | Stop reason: HOSPADM

## 2025-03-19 RX ORDER — ENOXAPARIN SODIUM 100 MG/ML
40 INJECTION SUBCUTANEOUS DAILY
Status: DISCONTINUED | OUTPATIENT
Start: 2025-03-20 | End: 2025-03-20 | Stop reason: HOSPADM

## 2025-03-19 RX ORDER — ACETAMINOPHEN 650 MG/1
650 SUPPOSITORY RECTAL EVERY 6 HOURS PRN
Status: DISCONTINUED | OUTPATIENT
Start: 2025-03-19 | End: 2025-03-20 | Stop reason: HOSPADM

## 2025-03-19 RX ORDER — INSULIN GLARGINE 100 [IU]/ML
40 INJECTION, SOLUTION SUBCUTANEOUS NIGHTLY
Status: DISCONTINUED | OUTPATIENT
Start: 2025-03-20 | End: 2025-03-20

## 2025-03-19 RX ORDER — ASPIRIN 325 MG
325 TABLET ORAL DAILY
Status: DISCONTINUED | OUTPATIENT
Start: 2025-03-20 | End: 2025-03-20 | Stop reason: HOSPADM

## 2025-03-19 RX ORDER — ATORVASTATIN CALCIUM 40 MG/1
40 TABLET, FILM COATED ORAL DAILY
Status: DISCONTINUED | OUTPATIENT
Start: 2025-03-20 | End: 2025-03-20

## 2025-03-19 RX ORDER — SODIUM CHLORIDE 0.9 % (FLUSH) 0.9 %
5-40 SYRINGE (ML) INJECTION EVERY 12 HOURS SCHEDULED
Status: DISCONTINUED | OUTPATIENT
Start: 2025-03-20 | End: 2025-03-20 | Stop reason: HOSPADM

## 2025-03-19 RX ORDER — ACETAMINOPHEN 325 MG/1
650 TABLET ORAL EVERY 6 HOURS PRN
Status: DISCONTINUED | OUTPATIENT
Start: 2025-03-19 | End: 2025-03-20 | Stop reason: HOSPADM

## 2025-03-19 RX ORDER — SODIUM CHLORIDE 9 MG/ML
INJECTION, SOLUTION INTRAVENOUS CONTINUOUS
Status: DISCONTINUED | OUTPATIENT
Start: 2025-03-20 | End: 2025-03-20 | Stop reason: HOSPADM

## 2025-03-19 RX ORDER — GLUCAGON 1 MG/ML
1 KIT INJECTION PRN
Status: DISCONTINUED | OUTPATIENT
Start: 2025-03-19 | End: 2025-03-20 | Stop reason: HOSPADM

## 2025-03-19 RX ORDER — ONDANSETRON 2 MG/ML
4 INJECTION INTRAMUSCULAR; INTRAVENOUS EVERY 6 HOURS PRN
Status: DISCONTINUED | OUTPATIENT
Start: 2025-03-19 | End: 2025-03-20 | Stop reason: HOSPADM

## 2025-03-19 RX ORDER — ONDANSETRON 2 MG/ML
4 INJECTION INTRAMUSCULAR; INTRAVENOUS ONCE
Status: COMPLETED | OUTPATIENT
Start: 2025-03-19 | End: 2025-03-19

## 2025-03-19 RX ORDER — INSULIN LISPRO 100 [IU]/ML
0-4 INJECTION, SOLUTION INTRAVENOUS; SUBCUTANEOUS
Status: DISCONTINUED | OUTPATIENT
Start: 2025-03-20 | End: 2025-03-20 | Stop reason: HOSPADM

## 2025-03-19 RX ORDER — AMLODIPINE BESYLATE 5 MG/1
5 TABLET ORAL DAILY
Status: DISCONTINUED | OUTPATIENT
Start: 2025-03-20 | End: 2025-03-20 | Stop reason: HOSPADM

## 2025-03-19 RX ORDER — LISINOPRIL 20 MG/1
20 TABLET ORAL DAILY
Status: DISCONTINUED | OUTPATIENT
Start: 2025-03-20 | End: 2025-03-20 | Stop reason: HOSPADM

## 2025-03-19 RX ORDER — IOPAMIDOL 755 MG/ML
75 INJECTION, SOLUTION INTRAVASCULAR
Status: COMPLETED | OUTPATIENT
Start: 2025-03-19 | End: 2025-03-19

## 2025-03-19 RX ORDER — ONDANSETRON 4 MG/1
4 TABLET, ORALLY DISINTEGRATING ORAL EVERY 8 HOURS PRN
Status: DISCONTINUED | OUTPATIENT
Start: 2025-03-19 | End: 2025-03-20 | Stop reason: HOSPADM

## 2025-03-19 RX ADMIN — SODIUM CHLORIDE: 0.9 INJECTION, SOLUTION INTRAVENOUS at 23:42

## 2025-03-19 RX ADMIN — IOPAMIDOL 75 ML: 755 INJECTION, SOLUTION INTRAVENOUS at 19:11

## 2025-03-19 RX ADMIN — SODIUM CHLORIDE 1000 ML: 9 INJECTION, SOLUTION INTRAVENOUS at 20:15

## 2025-03-19 RX ADMIN — ONDANSETRON 4 MG: 2 INJECTION, SOLUTION INTRAMUSCULAR; INTRAVENOUS at 18:38

## 2025-03-19 RX ADMIN — SODIUM CHLORIDE 1000 ML: 0.9 INJECTION, SOLUTION INTRAVENOUS at 18:37

## 2025-03-19 RX ADMIN — WATER 1000 MG: 1 INJECTION INTRAMUSCULAR; INTRAVENOUS; SUBCUTANEOUS at 21:30

## 2025-03-19 ASSESSMENT — ENCOUNTER SYMPTOMS
COUGH: 0
CONSTIPATION: 0
SHORTNESS OF BREATH: 0
VOMITING: 1
NAUSEA: 1
DIARRHEA: 1
WHEEZING: 0
ABDOMINAL PAIN: 0
COLOR CHANGE: 0

## 2025-03-19 NOTE — ED PROVIDER NOTES
[facility] EMERGENCY DEPARTMENT  EMERGENCY DEPARTMENT ENCOUNTER      Pt Name: Libertad Cruz  MRN: [id number]  Birthdate 1960  Date of evaluation: 3/19/2025  Provider: Hoa Anderson PA-C    CHIEF COMPLAINT       Chief Complaint   Patient presents with    Vomiting     Onset 4 weeks ago after starting mounjaro         HISTORY OF PRESENT ILLNESS   (Location/Symptom, Timing/Onset,Context/Setting, Quality, Duration, Modifying Factors, Severity)  Note limiting factors.   HPI    Libertad Cruz is a 64 y.o. female with a past medical history significant for diabetes, hyperlipidemia, hypertension, prior cholecystectomy who presents to the emergency department with a chief complaint of nausea and vomiting since starting Mounjaro.  Patient started on Mounjaro about 1 month ago, and states that it has been making her sick every time she takes it.  She took her most recent dose on Monday, and since then has been having severe nausea vomiting, is not able to keep anything down, her sugars have been getting low, into the 80s because she is not able to take anything by mouth, she is not trying to manage her hypoglycemia with peppermint candies, but states that it is not really helping.  She is not been able to keep down any medications.  She states that each time she is taken the Mounjaro it has made her sick, but this is the worst that it has been.  This is her fourth dose total, she states the first 2 doses are tolerable, but the third and fourth dose made her very ill.  She is having nausea, vomiting, diarrhea, she endorses a queasy feeling in her stomach without actual abdominal pain.  Denies fevers, but endorses chills.  Denies any recent ill contacts, no family members have been ill.  She also states that she is developed a cough, and is coughing up greenish mucus.  She denies chest pain or shortness of breath    Nursing Notes were reviewed.    Limitations to history: None  Outside historians none    REVIEW OF  file.    PATIENT REFERRED TO:  Radha Sewell MD  [address] [address]  [phone]    Schedule an appointment as soon as possible for a visit in 2 days      [facility] Emergency Department  [address] [facility] [address]  [phone]  Go to   If symptoms worsen      DISCHARGE MEDICATIONS:  New Prescriptions    No medications on file          (Please note that portions of this note were completed with a voice recognition program.  Efforts were made to edit the dictations but occasionally words are mis-transcribed.)    Hoa Anderson PA-C (electronically signed)

## 2025-03-20 VITALS
HEART RATE: 104 BPM | HEIGHT: 61 IN | RESPIRATION RATE: 18 BRPM | SYSTOLIC BLOOD PRESSURE: 132 MMHG | OXYGEN SATURATION: 99 % | TEMPERATURE: 99 F | DIASTOLIC BLOOD PRESSURE: 68 MMHG | WEIGHT: 160 LBS | BODY MASS INDEX: 30.21 KG/M2

## 2025-03-20 LAB
ALBUMIN SERPL-MCNC: 3.6 G/DL (ref 3.5–5.2)
ALP SERPL-CCNC: 91 U/L (ref 35–104)
ALT SERPL-CCNC: 14 U/L (ref 10–35)
ANION GAP SERPL CALCULATED.3IONS-SCNC: 11 MMOL/L (ref 8–16)
AST SERPL-CCNC: 20 U/L (ref 10–35)
BASOPHILS # BLD: 0 K/UL (ref 0–0.2)
BASOPHILS NFR BLD: 0.3 % (ref 0–1)
BILIRUB SERPL-MCNC: 0.5 MG/DL (ref 0.2–1.2)
BUN SERPL-MCNC: 18 MG/DL (ref 8–23)
CALCIUM SERPL-MCNC: 8.3 MG/DL (ref 8.8–10.2)
CHLORIDE SERPL-SCNC: 100 MMOL/L (ref 98–107)
CO2 SERPL-SCNC: 25 MMOL/L (ref 22–29)
CREAT SERPL-MCNC: 1 MG/DL (ref 0.5–0.9)
CREAT UR-MCNC: 96.6 MG/DL (ref 28–217)
EOSINOPHIL # BLD: 0 K/UL (ref 0–0.6)
EOSINOPHIL NFR BLD: 0.2 % (ref 0–5)
EOSINOPHIL URNS QL MICRO: NORMAL
ERYTHROCYTE [DISTWIDTH] IN BLOOD BY AUTOMATED COUNT: 15.9 % (ref 11.5–14.5)
GLUCOSE BLD-MCNC: 116 MG/DL (ref 70–99)
GLUCOSE BLD-MCNC: 146 MG/DL (ref 70–99)
GLUCOSE BLD-MCNC: 250 MG/DL (ref 70–99)
GLUCOSE SERPL-MCNC: 147 MG/DL (ref 70–99)
HCT VFR BLD AUTO: 39.7 % (ref 37–47)
HGB BLD-MCNC: 12.3 G/DL (ref 12–16)
IMM GRANULOCYTES # BLD: 0 K/UL
LYMPHOCYTES # BLD: 1.2 K/UL (ref 1.1–4.5)
LYMPHOCYTES NFR BLD: 13.9 % (ref 20–40)
MCH RBC QN AUTO: 24.3 PG (ref 27–31)
MCHC RBC AUTO-ENTMCNC: 31 G/DL (ref 33–37)
MCV RBC AUTO: 78.5 FL (ref 81–99)
MONOCYTES # BLD: 0.7 K/UL (ref 0–0.9)
MONOCYTES NFR BLD: 8.3 % (ref 0–10)
NEUTROPHILS # BLD: 6.7 K/UL (ref 1.5–7.5)
NEUTS SEG NFR BLD: 77 % (ref 50–65)
OSMOLALITY URINE: 545 MOSM/KG (ref 250–1200)
PERFORMED ON: ABNORMAL
PLATELET # BLD AUTO: 255 K/UL (ref 130–400)
PMV BLD AUTO: 9.2 FL (ref 9.4–12.3)
POTASSIUM SERPL-SCNC: 3.8 MMOL/L (ref 3.5–5)
PROT SERPL-MCNC: 5.8 G/DL (ref 6.4–8.3)
RBC # BLD AUTO: 5.06 M/UL (ref 4.2–5.4)
SODIUM SERPL-SCNC: 136 MMOL/L (ref 136–145)
SODIUM UR-SCNC: 80 MMOL/L
WBC # BLD AUTO: 8.8 K/UL (ref 4.8–10.8)

## 2025-03-20 PROCEDURE — 82962 GLUCOSE BLOOD TEST: CPT

## 2025-03-20 PROCEDURE — 6370000000 HC RX 637 (ALT 250 FOR IP)

## 2025-03-20 PROCEDURE — 6370000000 HC RX 637 (ALT 250 FOR IP): Performed by: EMERGENCY MEDICINE

## 2025-03-20 PROCEDURE — 96361 HYDRATE IV INFUSION ADD-ON: CPT

## 2025-03-20 PROCEDURE — 83935 ASSAY OF URINE OSMOLALITY: CPT

## 2025-03-20 PROCEDURE — 96372 THER/PROPH/DIAG INJ SC/IM: CPT

## 2025-03-20 PROCEDURE — 84300 ASSAY OF URINE SODIUM: CPT

## 2025-03-20 PROCEDURE — 6360000002 HC RX W HCPCS

## 2025-03-20 PROCEDURE — 80053 COMPREHEN METABOLIC PANEL: CPT

## 2025-03-20 PROCEDURE — 82570 ASSAY OF URINE CREATININE: CPT

## 2025-03-20 PROCEDURE — G0378 HOSPITAL OBSERVATION PER HR: HCPCS

## 2025-03-20 PROCEDURE — 85025 COMPLETE CBC W/AUTO DIFF WBC: CPT

## 2025-03-20 PROCEDURE — 96376 TX/PRO/DX INJ SAME DRUG ADON: CPT

## 2025-03-20 PROCEDURE — 87205 SMEAR GRAM STAIN: CPT

## 2025-03-20 PROCEDURE — 94760 N-INVAS EAR/PLS OXIMETRY 1: CPT

## 2025-03-20 PROCEDURE — 36415 COLL VENOUS BLD VENIPUNCTURE: CPT

## 2025-03-20 PROCEDURE — 2580000003 HC RX 258

## 2025-03-20 RX ORDER — INSULIN GLARGINE 100 [IU]/ML
40 INJECTION, SOLUTION SUBCUTANEOUS EVERY MORNING
Status: DISCONTINUED | OUTPATIENT
Start: 2025-03-20 | End: 2025-03-20 | Stop reason: HOSPADM

## 2025-03-20 RX ORDER — ATORVASTATIN CALCIUM 40 MG/1
40 TABLET, FILM COATED ORAL DAILY
Status: DISCONTINUED | OUTPATIENT
Start: 2025-03-20 | End: 2025-03-20 | Stop reason: HOSPADM

## 2025-03-20 RX ADMIN — ATORVASTATIN CALCIUM 40 MG: 40 TABLET, FILM COATED ORAL at 01:13

## 2025-03-20 RX ADMIN — AMLODIPINE BESYLATE 5 MG: 5 TABLET ORAL at 09:44

## 2025-03-20 RX ADMIN — ENOXAPARIN SODIUM 40 MG: 100 INJECTION SUBCUTANEOUS at 09:42

## 2025-03-20 RX ADMIN — ASPIRIN 325 MG: 325 TABLET ORAL at 09:44

## 2025-03-20 RX ADMIN — SODIUM CHLORIDE: 0.9 INJECTION, SOLUTION INTRAVENOUS at 11:09

## 2025-03-20 RX ADMIN — ONDANSETRON 4 MG: 2 INJECTION, SOLUTION INTRAMUSCULAR; INTRAVENOUS at 06:41

## 2025-03-20 RX ADMIN — INSULIN GLARGINE 40 UNITS: 100 INJECTION, SOLUTION SUBCUTANEOUS at 09:43

## 2025-03-20 RX ADMIN — INSULIN LISPRO 2 UNITS: 100 INJECTION, SOLUTION INTRAVENOUS; SUBCUTANEOUS at 12:13

## 2025-03-20 ASSESSMENT — PAIN SCALES - GENERAL: PAINLEVEL_OUTOF10: 0

## 2025-03-20 NOTE — PROGRESS NOTES
Libertad Cruz arrived to room # 313.   Presented with: Nausea and vomiting  Mental Status: Patient is oriented, alert, coherent, logical, thought processes intact, and able to concentrate and follow conversation.   Vitals:    03/19/25 2340   BP: (!) 106/59   Pulse: (!) 109   Resp: 18   Temp: 97.6 °F (36.4 °C)   SpO2: 96%     Patient safety contract and falls prevention contract reviewed with patient Yes.  Oriented Patient to room.  Call light within reach. Yes.  Needs, issues or concerns expressed at this time:  no.      Electronically signed by Joy Anguiano RN on 3/20/2025 at 2:42 AM

## 2025-03-20 NOTE — H&P
[facility]      Hospitalist - History & Physical      PCP: Luda Julio MD    Date of Admission: 3/19/2025    Date of Service: 3/19/2025    Chief Complaint:  Vomiting    History Of Present Illness:   The patient is a 64 y.o. female with diabetes, hyperlipidemia, HTN comes to ED complaining of vomiting x 4 weeks after starting Mounjaro.  Patient states that with each dose she has about 4 days of nausea, vomiting, and diarrhea. She took her most recent dose on Monday and has had the same side effects however she has felt weaker than previously.  Patient denies dizziness, fever, chills, abdominal pain, shortness of breath, and chest pain.     In ED: CT abdomen and pelvis with no CT evidence of acute pancreatitis, minimal intrahepatic biliary dilatation postcholecystectomy, nonobstructive intestinal gas pattern and suggestion of previous appendectomy; UA unremarkable for UTI; WBC 8.2, H&H 14.9/47.3, creatinine 1.4, BUN 23, GFR 42, CMP otherwise unremarkable.  Patient will be placed in observation to hospitalist.    Past Medical History:        Diagnosis Date    Diabetes mellitus (HCC)     Hyperlipidemia     Hypertension     Kidney stones        Past Surgical History:        Procedure Laterality Date    APPENDECTOMY      CHOLECYSTECTOMY      CYSTOSCOPY Right 3/24/2022    CYSTOSCOPY URETERAL STENT INSERTION performed by Malik Eaton MD at [facility] OR    HYSTERECTOMY (CERVIX STATUS UNKNOWN)      LITHOTRIPSY Right 3/24/2022    RIGHT RENAL EXTRACORPOREAL SHOCK WAVE LITHOTRIPSY performed by Malik Eaton MD at [facility] OR       Home Medications:  Prior to Admission medications    Medication Sig Start Date End Date Taking? Authorizing Provider   Tirzepatide (MOUNJARO) 2.5 MG/0.5ML SOAJ Inject into the skin once a week   Yes Jatin Day MD   insulin glargine (LANTUS SOLOSTAR) 100 UNIT/ML injection pen Inject 40 Units into the skin nightly 9/29/22   Jatin Day MD   Insulin Lispro (HUMALOG

## 2025-03-20 NOTE — DISCHARGE SUMMARY
Libertad Cruz  :  1960  MRN:  [id number]    Admit date:  3/19/2025  Discharge date:  3/20/2025    Discharging Physician:  Dr. Geeta Danielle    Advance Directive: Full Code    Consults: None     Primary Care Physician:  Luda Julio MD    Discharge Diagnoses:  Principal Problem:    Acute kidney injury  Active Problems:    Diarrhea    Hypertension    Hyperlipidemia    Diabetes mellitus (HCC)  Resolved Problems:    * No resolved hospital problems. *      Portions of this note have been copied forward, however, changed to reflect the most current clinical status of this patient.    Hospital Course:   The patient is a 64-year-old female with a PMH of type 2 diabetes, HLD, and HTN presented to [facility] ED on 3/19/2025 for complaints of nausea and vomiting for approximately 4 weeks after starting Mounjaro.  She stated that with each dose she had 4 days of nausea, vomiting and diarrhea.  Her p.o. intake and appetite have been poor.  She took a dose 3 days prior to her admission and she felt the same side effects but experienced increased weakness.  She denies syncope, fever, chills, or chest pain.  Further ED workup revealed , K4.2, CL 91, CO2 31, BUN 23 and creatinine 1.4 (baseline 0.9-1.0) blood glucose 167.  Small acetone.  WBC 8.2, H&H 14.9/47.3 with a platelet count of 317.  UA appears negative for infection.  RPP negative for viral illness.  CT of the abdomen pelvis with IV contrast revealed solid pulmonary nodules 0.8 and 0.7 cm recommend 3-month follow-up chest.  No evidence of acute pancreatitis.  Nonobstructive intestinal gas pattern and suggestion of previous appendectomy.  Minimal hepatic biliary dilatation postcholecystectomy.  She is admitted to hospital medicine for KELVIN.  She was given IV hydration overnight with an improvement in her renal functions to baseline with a BUN of 18 creatinine 1.0.  She is tolerating a diet without difficulty.  She has been advised to avoid Mounjaro.  Her vital signs are  than 2 seconds.   Neurological:      General: No focal deficit present.      Mental Status: She is alert and oriented to person, place, and time.         Discharge Medications:         Medication List        CONTINUE taking these medications      amLODIPine 5 MG tablet  Commonly known as: NORVASC     aspirin 325 MG tablet     atorvastatin 40 MG tablet  Commonly known as: LIPITOR     HUMALOG KWIKPEN SC     Lantus SoloStar 100 UNIT/ML injection pen  Generic drug: insulin glargine     lisinopril 20 MG tablet  Commonly known as: PRINIVIL;ZESTRIL            STOP taking these medications      Mounjaro 2.5 MG/0.5ML Soaj  Generic drug: Tirzepatide              Discharge Instructions:   Follow up with Luda Julio MD within 1 week of discharge for hospital follow-up.    Take medications as directed.    Resume activity as tolerated.    Diet: ADULT DIET; Regular; 4 carb choices (60 gm/meal)     Disposition: Patient is Stable and will be discharged to Home.    Time spent on discharge 38 minutes spent in assessing patient, reviewing medications, discussion with nursing, confirming safe discharge plan and preparation of discharge summary.    Signed:  NADIA Anders, 3/20/2025 1:15 PM

## 2025-03-20 NOTE — ED NOTES
ED TO INPATIENT SBAR HANDOFF    Patient Name: Libertad Cruz   : 1960  64 y.o.   Family/Caregiver Present: No  Code Status Order: Prior    C-SSRS: Risk of Suicide: No Risk  Sitter No  Restraints:         Situation  Chief Complaint:   Chief Complaint   Patient presents with    Vomiting     Onset 4 weeks ago after starting mounjaro     Patient Diagnosis: Acute kidney injury [N17.9]     Brief Description of Patient's Condition: Pt arrived from home c/o vomiting for 3 days. Pt recently started Mounjaro and the vomiting began.  PT is alert and oriented X4. Pt has had no complaints while in the ER and has been resting comfortably.  Pt has had rocephin and fluids while in the ER.   Pt is able to ambulate on her own.  VSS>      Mental Status: oriented, alert, coherent, logical, thought processes intact, and able to concentrate and follow conversation  Arrived from: home    Imaging:   CT ABDOMEN PELVIS W IV CONTRAST Additional Contrast? None   Final Result       - Solid pulmonary nodules measuring 0.8 cm and 0.7 cm respectively.  3-month follow-up chest CT recommended.   - No CT evidence of acute pancreatitis.   - Minimal intrahepatic biliary dilatation post cholecystectomy.   - Simple acquired left renal cyst.   - Nonobstructive intestinal gas pattern and suggestion of previous appendectomy.                   .        All CT scans are performed using dose optimization techniques as appropriate to the performed exam and include    at least one of the following: Automated exposure control, adjustment of the mA and/or kV according to size, and the use of iterative reconstruction technique.        ______________________________________    Electronically signed by: ADIEL CAMARENA D.O.   Date:     2025   Time:    20:21         COVID-19 Results:   Internal Administration   First Dose COVID-19, MODERNA BLUE border, Primary or Immunocompromised, (age 12y+), IM, 100 mcg/0.5mL  2021   Second Dose COVID-19,  at 2150    Electronically signed by: Electronically signed by Beatrice Alonso RN on 3/19/2025 at 10:41 PM

## 2025-03-20 NOTE — PLAN OF CARE
Problem: Chronic Conditions and Co-morbidities  Goal: Patient's chronic conditions and co-morbidity symptoms are monitored and maintained or improved  3/20/2025 1032 by Zelda Mccoy LPN  Outcome: Progressing  Flowsheets (Taken 3/20/2025 0039 by Joy Anguiano, RN)  Care Plan - Patient's Chronic Conditions and Co-Morbidity Symptoms are Monitored and Maintained or Improved: Monitor and assess patient's chronic conditions and comorbid symptoms for stability, deterioration, or improvement  3/20/2025 0009 by Joy Anguiano, RN  Outcome: Progressing     Problem: Discharge Planning  Goal: Discharge to home or other facility with appropriate resources  3/20/2025 1032 by Zelda Mccoy LPN  Outcome: Progressing  Flowsheets (Taken 3/20/2025 0039 by Joy Anguiano, RN)  Discharge to home or other facility with appropriate resources: Identify barriers to discharge with patient and caregiver  3/20/2025 0009 by Joy Anguiano RN  Outcome: Progressing     Problem: Safety - Adult  Goal: Free from fall injury  3/20/2025 1032 by Zelda Mccoy LPN  Outcome: Progressing  3/20/2025 0009 by Joy Anguiano, TAYLOR  Outcome: Progressing     Problem: ABCDS Injury Assessment  Goal: Absence of physical injury  3/20/2025 1032 by Zelda Mccoy LPN  Outcome: Progressing  3/20/2025 0009 by Joy Anguiano RN  Outcome: Progressing     Problem: Pain  Goal: Verbalizes/displays adequate comfort level or baseline comfort level  Outcome: Progressing     Problem: Nutrition Deficit:  Goal: Optimize nutritional status  Outcome: Progressing

## 2025-03-20 NOTE — PROGRESS NOTES
Comprehensive Nutrition Assessment    Type and Reason for Visit:  Initial, Positive nutrition screen    Nutrition Recommendations/Plan:   Follow for decreased nausea, increased po intake, labs     Malnutrition Assessment:  Malnutrition Status:  At risk for malnutrition (03/20/25 0930)    Context:  Acute Illness     Findings of the 6 clinical characteristics of malnutrition:  Energy Intake:  50% or less of estimated energy requirements for 5 or more days  Weight Loss:  Mild weight loss     Body Fat Loss:  No body fat loss     Muscle Mass Loss:  No muscle mass loss    Fluid Accumulation:  No fluid accumulation     Strength:  Not Performed    Nutrition Assessment:    +NS for decreased weight and po intake.  Pt had Regular diet ordered--modified with addition of Carb control d/t hx DM, Glucose 147-167 on Lantus.  At time of visit, pt was having issues with stomach upset and did not feel able to eat breakfast.  Was asking for clear liquid foods.  Tray was brought and pt was just starting on it.  No other preferences at this time were voice.  Pt has lost approx 14# or 8% of UBW since 5/6/2024--not a signifiant loss    Nutrition Related Findings:    Glujcose 147-167, Accuchek's 116-151 Wound Type: None       Current Nutrition Intake & Therapies:    Average Meal Intake: Unable to assess  Average Supplements Intake: None Ordered  ADULT DIET; Regular; 4 carb choices (60 gm/meal)    Anthropometric Measures:  Height: 154.9 cm (5' 0.98\")  Ideal Body Weight (IBW): 105 lbs (48 kg)    Admission Body Weight: 72.6 kg (160 lb 0.9 oz)  Current Body Weight: 72.6 kg (160 lb 0.9 oz), 152.4 % IBW. Weight Source: Stated  Current BMI (kg/m2): 30.3  Usual Body Weight: 78.9 kg (173 lb 15.1 oz) (5/6/2024)  % Weight Change (Calculated): -8  Weight Adjustment For: No Adjustment  BMI Categories: Obese Class 1 (BMI 30.0-34.9)    Estimated Daily Nutrient Needs:  Energy Requirements Based On: Kcal/kg  Weight Used for Energy Requirements:

## 2025-03-20 NOTE — PROGRESS NOTES
4 Eyes Skin Assessment     NAME:  Libertad Cruz  YOB: 1960  MEDICAL RECORD NUMBER:  [id number]    The patient is being assessed for  Admission    I agree that at least one RN has performed a thorough Head to Toe Skin Assessment on the patient. ALL assessment sites listed below have been assessed.      Areas assessed by both nurses:    Head, Face, Ears, Shoulders, Back, Chest, Arms, Elbows, Hands, Sacrum. Buttock, Coccyx, Ischium, Legs. Feet and Heels, and Under Medical Devices         Does the Patient have a Wound? No noted wound(s)       Jimi Prevention initiated by RN: Yes  Wound Care Orders initiated by RN: No    Pressure Injury (Stage 3,4, Unstageable, DTI, NWPT, and Complex wounds) if present, place Wound referral order by RN under : No    New Ostomies, if present place, Ostomy referral order under : No     Nurse 1 eSignature: Electronically signed by Joy Anguiano RN on 3/20/25 at 2:41 AM CDT    **SHARE this note so that the co-signing nurse can place an eSignature**    Nurse 2 eSignature: {Esignature:[id number]}

## 2025-03-21 LAB
B-OH-BUTYR SERPL-MCNC: 14.8 MG/DL (ref 0–3)
BACTERIA BLD CULT ORG #2: NORMAL
BACTERIA BLD CULT: NORMAL
BACTERIA UR CULT: ABNORMAL
BACTERIA UR CULT: ABNORMAL
ORGANISM: ABNORMAL

## 2025-03-25 LAB
BACTERIA BLD CULT ORG #2: NORMAL
BACTERIA BLD CULT: NORMAL

## (undated) DEVICE — GLOVE SURG SZ 75 CRM LTX FREE POLYISOPRENE POLYMER BEAD ANTI